# Patient Record
Sex: FEMALE | Race: AMERICAN INDIAN OR ALASKA NATIVE | NOT HISPANIC OR LATINO | Employment: FULL TIME | URBAN - METROPOLITAN AREA
[De-identification: names, ages, dates, MRNs, and addresses within clinical notes are randomized per-mention and may not be internally consistent; named-entity substitution may affect disease eponyms.]

---

## 2018-02-23 ENCOUNTER — HOSPITAL ENCOUNTER (EMERGENCY)
Facility: MEDICAL CENTER | Age: 28
End: 2018-02-23
Attending: EMERGENCY MEDICINE
Payer: MEDICAID

## 2018-02-23 VITALS
SYSTOLIC BLOOD PRESSURE: 130 MMHG | BODY MASS INDEX: 30.22 KG/M2 | RESPIRATION RATE: 15 BRPM | DIASTOLIC BLOOD PRESSURE: 85 MMHG | OXYGEN SATURATION: 100 % | HEART RATE: 108 BPM | HEIGHT: 66 IN | WEIGHT: 188.05 LBS | TEMPERATURE: 98 F

## 2018-02-23 DIAGNOSIS — L98.491 SKIN ULCER, LIMITED TO BREAKDOWN OF SKIN (HCC): ICD-10-CM

## 2018-02-23 PROCEDURE — A9270 NON-COVERED ITEM OR SERVICE: HCPCS | Performed by: EMERGENCY MEDICINE

## 2018-02-23 PROCEDURE — 700102 HCHG RX REV CODE 250 W/ 637 OVERRIDE(OP): Performed by: EMERGENCY MEDICINE

## 2018-02-23 PROCEDURE — 99283 EMERGENCY DEPT VISIT LOW MDM: CPT

## 2018-02-23 RX ORDER — CEPHALEXIN 500 MG/1
500 CAPSULE ORAL 4 TIMES DAILY
Qty: 28 CAP | Refills: 0 | Status: SHIPPED | OUTPATIENT
Start: 2018-02-23 | End: 2018-03-02

## 2018-02-23 RX ORDER — SULFAMETHOXAZOLE AND TRIMETHOPRIM 800; 160 MG/1; MG/1
1 TABLET ORAL 2 TIMES DAILY
Qty: 14 TAB | Refills: 0 | Status: SHIPPED | OUTPATIENT
Start: 2018-02-23 | End: 2018-03-02

## 2018-02-23 RX ORDER — IBUPROFEN 600 MG/1
600 TABLET ORAL ONCE
Status: COMPLETED | OUTPATIENT
Start: 2018-02-23 | End: 2018-02-23

## 2018-02-23 RX ADMIN — IBUPROFEN 600 MG: 600 TABLET, FILM COATED ORAL at 05:05

## 2018-02-23 NOTE — DISCHARGE INSTRUCTIONS
Skin Ulcer  A skin ulcer is an open sore that can be shallow or deep. Skin ulcers sometimes become infected and are difficult to treat. It may be 1 month or longer before real healing progress is made.  CAUSES   · Injury.  · Problems with the veins or arteries.  · Diabetes.  · Insect bites.  · Bedsores.  · Inflammatory conditions.  SYMPTOMS   · Pain, redness, swelling, and tenderness around the ulcer.  · Fever.  · Bleeding from the ulcer.  · Yellow or clear fluid coming from the ulcer.  DIAGNOSIS   There are many types of skin ulcers. Any open sores will be examined. Certain tests will be done to determine the kind of ulcer you have. The right treatment depends on the type of ulcer you have.  TREATMENT   Treatment is a long-term challenge. It may include:  · Wearing an elastic wrap, compression stockings, or gel cast over the ulcer area.  · Taking antibiotic medicines or putting antibiotic creams on the affected area if there is an infection.  HOME CARE INSTRUCTIONS  · Put on your bandages (dressings), wraps, or casts over the ulcer as directed by your caregiver.  · Change all dressings as directed by your caregiver.  · Take all medicines as directed by your caregiver.  · Keep the affected area clean and dry.  · Avoid injuries to the affected area.  · Eat a well-balanced, healthy diet that includes plenty of fruit and vegetables.  · If you smoke, consider quitting or decreasing the amount of cigarettes you smoke.  · Once the ulcer heals, get regular exercise as directed by your caregiver.  · Work with your caregiver to make sure your blood pressure, cholesterol, and diabetes are well-controlled.  · Keep your skin moisturized. Dry skin can crack and lead to skin ulcers.  SEEK IMMEDIATE MEDICAL CARE IF:   · Your pain gets worse.  · You have swelling, redness, or fluids around the ulcer.  · You have chills.  · You have a fever.  MAKE SURE YOU:   · Understand these instructions.  · Will watch your condition.  · Will get  help right away if you are not doing well or get worse.     This information is not intended to replace advice given to you by your health care provider. Make sure you discuss any questions you have with your health care provider.     Document Released: 01/25/2006 Document Revised: 03/11/2013 Document Reviewed: 08/03/2012  Elsevier Interactive Patient Education ©2016 [employer]

## 2018-02-23 NOTE — ED PROVIDER NOTES
"ED Provider Note    Scribed for Clyde Sandoval M.D. by Alexi Gabriel. 2/23/2018, 4:25 AM.    Primary care provider: Pcp Not In Computer  Means of arrival: Walk in  History obtained from: Patient  History limited by: None    CHIEF COMPLAINT  Chief Complaint   Patient presents with   • Skin Lesion       HPI  Rhonda Lopez is a 27 y.o. female who presents to the Emergency Department for evaluation of a skin lesion localized to the right side of her neck onset 2 weeks ago. The patient claims the lesion recently began to increase in size when she spent 4 days in [facility]. She reports associated itchiness around the area by the lesion. The patient denies rash or other wounds. No alleviating or exacerbating factors are identified at this time.     REVIEW OF SYSTEMS  See HPI for further details.   E.    PAST MEDICAL HISTORY   None noted    SURGICAL HISTORY   has a past surgical history that includes other.    SOCIAL HISTORY  Social History   Substance Use Topics   • Smoking status: Former Smoker   • Smokeless tobacco: None noted   • Alcohol use No      History   Drug Use No       FAMILY HISTORY  No pertinent family history noted.    CURRENT MEDICATIONS  Reviewed.  See Encounter Summary.     ALLERGIES  No Known Allergies    PHYSICAL EXAM  VITAL SIGNS: /88   Pulse (!) 118   Temp 36.7 °C (98 °F)   Resp 14   Ht 1.676 m (5' 6\")   Wt 85.3 kg (188 lb 0.8 oz)   SpO2 98%   BMI 30.35 kg/m²   Constitutional: Alert in no apparent distress.  HENT: Normocephalic, Atraumatic, Bilateral external ears normal. Nose normal. Small anterior cervical lymphadenopathy on right that is mildly tender to palpation.   Eyes: Pupils are equal and reactive. Conjunctiva normal, non-icteric.   Heart: Tachycardci rate and regular rythm, no murmurs.    Lungs: Clear to auscultation bilaterally.  Skin: Warm, Dry. 3 cm ovoid dermal lesion to right lateral neck at base with rolled edge and ulcerated centered scab. Slight purulent base in middle. "   Neurologic: Alert, Grossly non-focal.   Psychiatric: Anxious, Judgment normal, Appears appropriate and not intoxicated.     COURSE & MEDICAL DECISION MAKING  Pertinent Labs & Imaging studies reviewed. (See chart for details)    4:25 AM - Patient seen and examined at bedside. She was informed she will be discharged home with antibiotics and a referral for a dermatologist. The patient is agreeable to discharge.     Decision Making:  This is a 27 y.o. year old female who presents with ulcerative skin lesion to right neck. Patient does have rolled edges with a central scabs area with slight exudative base. There is no central eschar. Less likely cutaneous anthrax. Additionally possibilities include basal or squamous cell carcinomas although this would be an atypical presentation given the rapid onset. Most likely secondary bacterial infection from excoriation. This does not appear to be fungal. I will prescribe Keflex and Bactrim for empiric treatment and have her follow up with dermatology as referred.    The patient will return for new or worsening symptoms and is stable at the time of discharge.    The patient is referred to a primary physician for blood pressure management, diabetic screening, and for all other preventative health concerns.    DISPOSITION:  Patient will be discharged home in stable condition.    FOLLOW UP:  [place] [id number] [facility] [address]  [facility]  [phone]          Roberto Zazueta M.D.  [address] [facility] [address]  [facility] [address]  [phone]    Schedule an appointment as soon as possible for a visit        OUTPATIENT MEDICATIONS:  Discharge Medication List as of 2/23/2018  4:56 AM      START taking these medications    Details   cephALEXin (KEFLEX) 500 MG Cap Take 1 Cap by mouth 4 times a day for 7 days., Disp-28 Cap, R-0, Print Rx Paper      sulfamethoxazole-trimethoprim (BACTRIM DS) 800-160 MG tablet Take 1 Tab by mouth 2 times a day for 7 days., Disp-14 Tab, R-0, Print Rx Paper                FINAL IMPRESSION  1. Skin ulcer, limited to breakdown of skin (CMS-[facility])          IAlexi (Scribe), am scribing for, and in the presence of, Clyde Sandoval M.D..    Electronically signed by: Alexi Gabriel (Scriberin), 2/23/2018    Clyde ROCKWELL M.D. personally performed the services described in this documentation, as scribed by Alexi Gabriel in my presence, and it is both accurate and complete.    The note accurately reflects work and decisions made by me.  Clyde Sandoval  2/23/2018  5:49 AM

## 2018-02-23 NOTE — ED NOTES
Nurse introduced self to pt. Updated on plan of care, what was to be expected. Pt expressed understanding. Call light within reach, siderail up x1 for safety.

## 2018-02-23 NOTE — ED TRIAGE NOTES
Pt ambulatory to triage. Pt has lesion on R neck. Pt says is started like a pimple/ingrown hair but has gotten worse. Pt also states she may have eaten some bad food and has had an upset stomach for a few days.    Chief Complaint   Patient presents with   • Skin Lesion     Pt placed in lobby, updated on triage process. Pt educated to notified RN or triage tech if changes in condition.

## 2018-10-26 PROCEDURE — 99285 EMERGENCY DEPT VISIT HI MDM: CPT

## 2018-10-27 ENCOUNTER — HOSPITAL ENCOUNTER (EMERGENCY)
Facility: MEDICAL CENTER | Age: 28
End: 2018-10-27
Attending: EMERGENCY MEDICINE
Payer: MEDICAID

## 2018-10-27 ENCOUNTER — APPOINTMENT (OUTPATIENT)
Dept: RADIOLOGY | Facility: MEDICAL CENTER | Age: 28
End: 2018-10-27
Payer: MEDICAID

## 2018-10-27 ENCOUNTER — APPOINTMENT (OUTPATIENT)
Dept: RADIOLOGY | Facility: MEDICAL CENTER | Age: 28
End: 2018-10-27
Attending: EMERGENCY MEDICINE
Payer: MEDICAID

## 2018-10-27 VITALS
OXYGEN SATURATION: 97 % | HEART RATE: 89 BPM | HEIGHT: 67 IN | BODY MASS INDEX: 36.37 KG/M2 | DIASTOLIC BLOOD PRESSURE: 74 MMHG | TEMPERATURE: 98.1 F | SYSTOLIC BLOOD PRESSURE: 126 MMHG | WEIGHT: 231.7 LBS | RESPIRATION RATE: 19 BRPM

## 2018-10-27 DIAGNOSIS — J06.9 VIRAL UPPER RESPIRATORY TRACT INFECTION: ICD-10-CM

## 2018-10-27 DIAGNOSIS — N30.00 ACUTE CYSTITIS WITHOUT HEMATURIA: ICD-10-CM

## 2018-10-27 LAB
ALBUMIN SERPL BCP-MCNC: 4 G/DL (ref 3.2–4.9)
ALBUMIN/GLOB SERPL: 1.4 G/DL
ALP SERPL-CCNC: 82 U/L (ref 30–99)
ALT SERPL-CCNC: 15 U/L (ref 2–50)
ANION GAP SERPL CALC-SCNC: 10 MMOL/L (ref 0–11.9)
APPEARANCE UR: ABNORMAL
AST SERPL-CCNC: 17 U/L (ref 12–45)
BACTERIA #/AREA URNS HPF: ABNORMAL /HPF
BASOPHILS # BLD AUTO: 0.3 % (ref 0–1.8)
BASOPHILS # BLD: 0.05 K/UL (ref 0–0.12)
BILIRUB SERPL-MCNC: 0.3 MG/DL (ref 0.1–1.5)
BILIRUB UR QL STRIP.AUTO: NEGATIVE
BUN SERPL-MCNC: 6 MG/DL (ref 8–22)
CALCIUM SERPL-MCNC: 9.3 MG/DL (ref 8.5–10.5)
CHLORIDE SERPL-SCNC: 102 MMOL/L (ref 96–112)
CO2 SERPL-SCNC: 20 MMOL/L (ref 20–33)
COLOR UR: YELLOW
CREAT SERPL-MCNC: 0.6 MG/DL (ref 0.5–1.4)
EOSINOPHIL # BLD AUTO: 0.01 K/UL (ref 0–0.51)
EOSINOPHIL NFR BLD: 0.1 % (ref 0–6.9)
EPI CELLS #/AREA URNS HPF: ABNORMAL /HPF
ERYTHROCYTE [DISTWIDTH] IN BLOOD BY AUTOMATED COUNT: 47.3 FL (ref 35.9–50)
FLUAV RNA SPEC QL NAA+PROBE: NEGATIVE
FLUBV RNA SPEC QL NAA+PROBE: NEGATIVE
GLOBULIN SER CALC-MCNC: 2.9 G/DL (ref 1.9–3.5)
GLUCOSE SERPL-MCNC: 131 MG/DL (ref 65–99)
GLUCOSE UR STRIP.AUTO-MCNC: NEGATIVE MG/DL
HCG SERPL QL: POSITIVE
HCT VFR BLD AUTO: 32.6 % (ref 37–47)
HGB BLD-MCNC: 10.1 G/DL (ref 12–16)
HYALINE CASTS #/AREA URNS LPF: ABNORMAL /LPF
IMM GRANULOCYTES # BLD AUTO: 0.07 K/UL (ref 0–0.11)
IMM GRANULOCYTES NFR BLD AUTO: 0.5 % (ref 0–0.9)
KETONES UR STRIP.AUTO-MCNC: NEGATIVE MG/DL
LACTATE BLD-SCNC: 1.3 MMOL/L (ref 0.5–2)
LACTATE BLD-SCNC: 2.2 MMOL/L (ref 0.5–2)
LEUKOCYTE ESTERASE UR QL STRIP.AUTO: ABNORMAL
LYMPHOCYTES # BLD AUTO: 0.95 K/UL (ref 1–4.8)
LYMPHOCYTES NFR BLD: 6.6 % (ref 22–41)
MCH RBC QN AUTO: 22.9 PG (ref 27–33)
MCHC RBC AUTO-ENTMCNC: 31 G/DL (ref 33.6–35)
MCV RBC AUTO: 73.9 FL (ref 81.4–97.8)
MICRO URNS: ABNORMAL
MONOCYTES # BLD AUTO: 0.89 K/UL (ref 0–0.85)
MONOCYTES NFR BLD AUTO: 6.2 % (ref 0–13.4)
NEUTROPHILS # BLD AUTO: 12.37 K/UL (ref 2–7.15)
NEUTROPHILS NFR BLD: 86.3 % (ref 44–72)
NITRITE UR QL STRIP.AUTO: NEGATIVE
NRBC # BLD AUTO: 0 K/UL
NRBC BLD-RTO: 0 /100 WBC
PH UR STRIP.AUTO: 5.5 [PH]
PLATELET # BLD AUTO: 286 K/UL (ref 164–446)
PMV BLD AUTO: 8.8 FL (ref 9–12.9)
POTASSIUM SERPL-SCNC: 3.5 MMOL/L (ref 3.6–5.5)
PROT SERPL-MCNC: 6.9 G/DL (ref 6–8.2)
PROT UR QL STRIP: NEGATIVE MG/DL
RBC # BLD AUTO: 4.41 M/UL (ref 4.2–5.4)
RBC # URNS HPF: ABNORMAL /HPF
RBC UR QL AUTO: NEGATIVE
SODIUM SERPL-SCNC: 132 MMOL/L (ref 135–145)
SP GR UR STRIP.AUTO: 1.01
UROBILINOGEN UR STRIP.AUTO-MCNC: 1 MG/DL
WBC # BLD AUTO: 14.3 K/UL (ref 4.8–10.8)
WBC #/AREA URNS HPF: ABNORMAL /HPF

## 2018-10-27 PROCEDURE — A9270 NON-COVERED ITEM OR SERVICE: HCPCS | Performed by: EMERGENCY MEDICINE

## 2018-10-27 PROCEDURE — 700105 HCHG RX REV CODE 258: Performed by: EMERGENCY MEDICINE

## 2018-10-27 PROCEDURE — 700111 HCHG RX REV CODE 636 W/ 250 OVERRIDE (IP): Performed by: EMERGENCY MEDICINE

## 2018-10-27 PROCEDURE — 700102 HCHG RX REV CODE 250 W/ 637 OVERRIDE(OP): Performed by: EMERGENCY MEDICINE

## 2018-10-27 PROCEDURE — 87086 URINE CULTURE/COLONY COUNT: CPT

## 2018-10-27 PROCEDURE — 85025 COMPLETE CBC W/AUTO DIFF WBC: CPT

## 2018-10-27 PROCEDURE — 83605 ASSAY OF LACTIC ACID: CPT

## 2018-10-27 PROCEDURE — 84703 CHORIONIC GONADOTROPIN ASSAY: CPT

## 2018-10-27 PROCEDURE — 87040 BLOOD CULTURE FOR BACTERIA: CPT

## 2018-10-27 PROCEDURE — 36415 COLL VENOUS BLD VENIPUNCTURE: CPT

## 2018-10-27 PROCEDURE — 80053 COMPREHEN METABOLIC PANEL: CPT

## 2018-10-27 PROCEDURE — 87502 INFLUENZA DNA AMP PROBE: CPT

## 2018-10-27 PROCEDURE — 71045 X-RAY EXAM CHEST 1 VIEW: CPT

## 2018-10-27 PROCEDURE — 81001 URINALYSIS AUTO W/SCOPE: CPT

## 2018-10-27 PROCEDURE — 96365 THER/PROPH/DIAG IV INF INIT: CPT

## 2018-10-27 RX ORDER — ACETAMINOPHEN 325 MG/1
650 TABLET ORAL ONCE
Status: COMPLETED | OUTPATIENT
Start: 2018-10-27 | End: 2018-10-27

## 2018-10-27 RX ORDER — CEPHALEXIN 500 MG/1
500 CAPSULE ORAL 3 TIMES DAILY
Qty: 15 CAP | Refills: 0 | Status: SHIPPED | OUTPATIENT
Start: 2018-10-27 | End: 2018-11-01

## 2018-10-27 RX ORDER — SODIUM CHLORIDE 9 MG/ML
30 INJECTION, SOLUTION INTRAVENOUS ONCE
Status: COMPLETED | OUTPATIENT
Start: 2018-10-27 | End: 2018-10-27

## 2018-10-27 RX ADMIN — SODIUM CHLORIDE 3153 ML: 9 INJECTION, SOLUTION INTRAVENOUS at 01:01

## 2018-10-27 RX ADMIN — ACETAMINOPHEN 650 MG: 325 TABLET, FILM COATED ORAL at 04:43

## 2018-10-27 RX ADMIN — CEFTRIAXONE SODIUM 2 G: 2 INJECTION, POWDER, FOR SOLUTION INTRAMUSCULAR; INTRAVENOUS at 01:56

## 2018-10-27 NOTE — ED TRIAGE NOTES
Pt ambulatory to triage c/o chest/ nasal congestion/ sore throat/ productive cough x 2 weeks. Fever today. Oral temp 100.6 in triage. Pt reports hx of asthma used inhaler PTA. Dry cough noted, Mask applied in triage. Pt also reports she is pregnant, LMP end of August. Sepsis score of 3. Charge RN informed, working on room.

## 2018-10-27 NOTE — ED NOTES
Second set of blood cultures drawn and sent to lab. UA sample obtained. Pt on monitor, fluids started

## 2018-10-27 NOTE — ED PROVIDER NOTES
"ED Provider Note    CHIEF COMPLAINT  Chief Complaint   Patient presents with   • Cold Symptoms       HPI  Rhonda Lopez is a 27 y.o. female who presents with chest congestion, nasal congestion, sore throat, cough.  Has been worsening over the past few days.  Cough is dry in nature.  No known sick contacts.  Recent travel.  Presenting with fever.  Slight chest discomfort with coughing.    The patient did have some nausea recently and did a home pregnancy test.  Last menstrual period was in August.  States that she believes she is pregnant.  Denies vaginal bleeding or discharge.  Denies abdominal pain.  No dysuria or hematuria.    REVIEW OF SYSTEMS  See HPI for further details. All other systems are negative.     PAST MEDICAL HISTORY   Denies significant past medical history.    SOCIAL HISTORY  Social History     Social History Main Topics   • Smoking status: Current Every Day Smoker     Packs/day: 0.50     Types: Cigarettes   • Smokeless tobacco: Never Used   • Alcohol use No   • Drug use: No   • Sexual activity: Not on file       SURGICAL HISTORY   has a past surgical history that includes other.    CURRENT MEDICATIONS  Home Medications    **Home medications have not yet been reviewed for this encounter**         ALLERGIES  No Known Allergies    PHYSICAL EXAM  VITAL SIGNS: /74   Pulse (!) 137   Temp (!) 38.1 °C (100.6 °F)   Resp 20   Ht 1.689 m (5' 6.5\")   Wt 105.1 kg (231 lb 11.3 oz)   LMP 08/20/2018   SpO2 97%   BMI 36.84 kg/m²    Pulse ox interpretation: I interpret this pulse ox as normal.  Constitutional: Alert in no apparent distress.  HENT: No signs of trauma, Bilateral external ears normal, Nose normal.  Dry mucous membranes.  Eyes: Pupils are equal and reactive, Conjunctiva normal, Non-icteric.   Neck: Normal range of motion, No tenderness, Supple, No stridor.   Lymphatic: No lymphadenopathy noted.   Cardiovascular: Tachycardic rate and regular rhythm.   Thorax & Lungs: Normal breath " "sounds, No respiratory distress, No wheezing, No chest tenderness.   Abdomen: Bowel sounds normal, Soft, No tenderness, No masses, No pulsatile masses. No peritoneal signs.  Skin: Warm, Dry, No erythema, No rash.   Back: No bony tenderness, No CVA tenderness.   Extremities: Intact distal pulses, No edema, No tenderness, No cyanosis  Neurologic: Alert, No focal deficits noted.       DIAGNOSTIC STUDIES / PROCEDURES      LABS  Labs Reviewed   LACTIC ACID - Abnormal; Notable for the following:        Result Value    Lactic Acid 2.2 (*)     All other components within normal limits   CBC WITH DIFFERENTIAL - Abnormal; Notable for the following:     WBC 14.3 (*)     Hemoglobin 10.1 (*)     Hematocrit 32.6 (*)     MCV 73.9 (*)     MCH 22.9 (*)     MCHC 31.0 (*)     MPV 8.8 (*)     Neutrophils-Polys 86.30 (*)     Lymphocytes 6.60 (*)     Neutrophils (Absolute) 12.37 (*)     Lymphs (Absolute) 0.95 (*)     Monos (Absolute) 0.89 (*)     All other components within normal limits   COMP METABOLIC PANEL - Abnormal; Notable for the following:     Sodium 132 (*)     Potassium 3.5 (*)     Glucose 131 (*)     Bun 6 (*)     All other components within normal limits   URINALYSIS - Abnormal; Notable for the following:     Character Cloudy (*)     Leukocyte Esterase Moderate (*)     All other components within normal limits    Narrative:     Indication for culture:->Emergency Room Patient   HCG QUAL SERUM - Abnormal; Notable for the following:     Beta-Hcg Qualitative Serum Positive (*)     All other components within normal limits   URINE MICROSCOPIC (W/UA) - Abnormal; Notable for the following:     WBC 20-50 (*)     Bacteria Moderate (*)     All other components within normal limits    Narrative:     Indication for culture:->Emergency Room Patient   LACTIC ACID   URINE CULTURE(NEW)    Narrative:     Indication for culture:->Emergency Room Patient   BLOOD CULTURE    Narrative:     Per Hospital Policy: Only change Specimen Src: to \"Line\" " "if  specified by physician order.   BLOOD CULTURE    Narrative:     Per Hospital Policy: Only change Specimen Src: to \"Line\" if  specified by physician order.   INFLUENZA A/B BY PCR    Narrative:     Indication for culture:->Emergency Room Patient   ESTIMATED GFR       RADIOLOGY  DX-CHEST-PORTABLE (1 VIEW)   Final Result      No acute cardiopulmonary abnormality.          COURSE & MEDICAL DECISION MAKING  Pertinent Labs & Imaging studies reviewed. (See chart for details)  27 y.o. Female presenting with cough, congestion, fevers.  Tachycardic upon arrival.  Clear breath sounds bilaterally.  No respiratory distress.  Symptoms are most consistent with an upper respiratory tract infection of a viral etiology.  Cannot fully rule out pneumonia however.  X-ray was performed that was unremarkable.  Laboratory studies showing the patient is influenza negative.  Slight leukocytosis and minimally elevated lactic acid.  Was given IV fluid resuscitation for concerns of sepsis and significant tachycardia.  30 cc/kg bolus was ordered for the patient.    Incidentally, the patient is pregnant.  No abdominal pain.  No vaginal bleeding or discharge.  No dysuria or hematuria.  Does not have symptoms of urinary tract infection.  Urinalysis is concerning for urinary tract infection however.  She was given a dose of ceftriaxone here while she was being monitored for response to therapies administered in the emergency department.    Upon reevaluation, the patient has much improved vital signs.  Continues to deny abdominal pain, nausea, vomiting.  She reconfirms that her primary complaints for cough and congestion.  No obvious signs of bacterial respiratory illness.  Clear voice.  No stridor.  No wheezing.  No rales.  No evidence of respiratory distress.    Vitals are markedly improved upon reevaluation.  Patient is feeling improved overall as well.  Strongly suspect an upper respiratory tract infection of a viral etiology.  Has incidental " "urinary tract infection findings on urinalysis.  Especially given the patient's pregnant status, will treat with antibiotics for suspected urinary tract infection.  To follow-up with primary care physician and pregnancy center for further management.  To return immediately for any worsening of the patient's symptoms or development of any other concerning signs or symptoms.  Lactic acid improved upon reevaluation following IV fluid resuscitation.    The patient will not drink alcohol nor drive with prescribed medications.   The patient will return for worsening symptoms or failure of improvement and is stable at the time of discharge. The patient verbalizes understanding in their own words.    /74   Pulse 89   Temp 36.7 °C (98.1 °F)   Resp 19   Ht 1.689 m (5' 6.5\")   Wt 105.1 kg (231 lb 11.3 oz)   LMP 08/20/2018   SpO2 97%   BMI 36.84 kg/m²      The patient was referred to primary care where they will receive further BP management.      HYDRATION: Based on the patient's presentation of Sepsis and Tachycardia the patient was given IV fluids. IV Hydration was used becasue oral hydration was not as rapid as required. Upon recheck following hydration, the patient was Improved.      [facility], Emergency Dept  [facility] [facility] [address] [phone]    As needed, If symptoms worsen    Pcp Not In Computer    Schedule an appointment as soon as possible for a visit       FINAL IMPRESSION  1. Viral upper respiratory tract infection    2. Acute cystitis without hematuria            Electronically signed by: Orestes Lemus, 10/27/2018 12:42 AM    "

## 2018-10-29 LAB
BACTERIA UR CULT: ABNORMAL
BACTERIA UR CULT: ABNORMAL
SIGNIFICANT IND 70042: ABNORMAL
SITE SITE: ABNORMAL
SOURCE SOURCE: ABNORMAL

## 2018-10-31 ENCOUNTER — NON-PROVIDER VISIT (OUTPATIENT)
Dept: OBGYN | Facility: CLINIC | Age: 28
End: 2018-10-31
Payer: MEDICAID

## 2018-10-31 DIAGNOSIS — Z32.01 PREGNANCY EXAMINATION OR TEST, POSITIVE RESULT: ICD-10-CM

## 2018-10-31 LAB
INT CON NEG: NEGATIVE
INT CON POS: POSITIVE
POC URINE PREGNANCY TEST: POSITIVE

## 2018-10-31 PROCEDURE — 81025 URINE PREGNANCY TEST: CPT | Performed by: OBSTETRICS & GYNECOLOGY

## 2018-10-31 NOTE — ED NOTES
"ED Positive Culture Follow-up/Notification Note:    Date: 10/31/18     Patient seen in the ED on 10/26/2018 for upper respiratory symptoms with fever.   1. Viral upper respiratory tract infection    2. Acute cystitis without hematuria       Patient received ceftriaxone 2gm IV once before discharge.  Discharge Medication List as of 10/27/2018  5:33 AM      START taking these medications    Details   cephALEXin (KEFLEX) 500 MG Cap Take 1 Cap by mouth 3 times a day for 5 days., Disp-15 Cap, R-0, Print Rx Paper             Allergies: Patient has no known allergies.     Vitals:    10/27/18 0430 10/27/18 0500 10/27/18 0513 10/27/18 0530   BP:       Pulse: (!) 101 96  89   Resp: 18 19 19   Temp:   36.7 °C (98.1 °F)    TempSrc:       SpO2: 98% 98%  97%   Weight:       Height:           Final cultures:   Results     Procedure Component Value Units Date/Time    URINE CULTURE(NEW) [[id number]]  (Abnormal) Collected:  10/27/18 0100    Order Status:  Completed Specimen:  Urine Updated:  10/29/18 0933     Significant Indicator POS (POS)     Source UR     Site --     Urine Culture Mixed skin amilcar 10-50,000 cfu/mL (A)      Probable Gardnerella vaginalis  >100,000 cfu/mL   (A)    Narrative:       Indication for culture:->Emergency Room Patient    BLOOD CULTURE [[id number]] Collected:  10/27/18 0105    Order Status:  Completed Specimen:  Blood from Peripheral Updated:  10/28/18 0926     Significant Indicator NEG     Source BLD     Site PERIPHERAL     Blood Culture No Growth    Note: Blood cultures are incubated for 5 days and  are monitored continuously.Positive blood cultures  are called to the RN and reported as soon as  they are identified.      Narrative:       Per Hospital Policy: Only change Specimen Src: to \"Line\" if  specified by physician order.    BLOOD CULTURE [[id number]] Collected:  10/27/18 0045    Order Status:  Completed Specimen:  Blood from Peripheral Updated:  10/28/18 0926     Significant Indicator NEG     Source " "BLD     Site PERIPHERAL     Blood Culture No Growth    Note: Blood cultures are incubated for 5 days and  are monitored continuously.Positive blood cultures  are called to the RN and reported as soon as  they are identified.      Narrative:       Per Hospital Policy: Only change Specimen Src: to \"Line\" if  specified by physician order.    INFLUENZA A/B BY PCR [[id number]] Collected:  10/27/18 0100    Order Status:  Completed Specimen:  Urine Updated:  10/27/18 0202     Influenza virus A RNA Negative     Influenza virus B, PCR Negative    Narrative:       Indication for culture:->Emergency Room Patient    URINALYSIS [[id number]]  (Abnormal) Collected:  10/27/18 0100    Order Status:  Completed Specimen:  Urine Updated:  10/27/18 0118     Color Yellow     Character Cloudy (A)     Specific Gravity 1.007     Ph 5.5     Glucose Negative mg/dL      Ketones Negative mg/dL      Protein Negative mg/dL      Bilirubin Negative     Urobilinogen, Urine 1.0     Nitrite Negative     Leukocyte Esterase Moderate (A)     Occult Blood Negative     Micro Urine Req Microscopic    Narrative:       Indication for culture:->Emergency Room Patient    INFLUENZA RAPID [[id number]]     Order Status:  Canceled Specimen:  Throat from Respiratory           Plan:   Patient did not present with urinary symptoms, however she stated she believed she is pregnant. No changes required based upon culture result. This is likely not a true pathogen.      Shanna Marie, PharmD    "

## 2018-11-01 LAB
BACTERIA BLD CULT: NORMAL
BACTERIA BLD CULT: NORMAL
SIGNIFICANT IND 70042: NORMAL
SIGNIFICANT IND 70042: NORMAL
SITE SITE: NORMAL
SITE SITE: NORMAL
SOURCE SOURCE: NORMAL
SOURCE SOURCE: NORMAL

## 2018-11-09 ENCOUNTER — INITIAL PRENATAL (OUTPATIENT)
Dept: OBGYN | Facility: CLINIC | Age: 28
End: 2018-11-09
Payer: MEDICAID

## 2018-11-09 ENCOUNTER — HOSPITAL ENCOUNTER (OUTPATIENT)
Facility: MEDICAL CENTER | Age: 28
End: 2018-11-09
Attending: NURSE PRACTITIONER
Payer: MEDICAID

## 2018-11-09 ENCOUNTER — HOSPITAL ENCOUNTER (OUTPATIENT)
Dept: LAB | Facility: MEDICAL CENTER | Age: 28
End: 2018-11-09
Attending: NURSE PRACTITIONER
Payer: MEDICAID

## 2018-11-09 VITALS
DIASTOLIC BLOOD PRESSURE: 64 MMHG | WEIGHT: 225 LBS | BODY MASS INDEX: 36.16 KG/M2 | HEIGHT: 66 IN | SYSTOLIC BLOOD PRESSURE: 110 MMHG

## 2018-11-09 DIAGNOSIS — O09.91 SUPERVISION OF HIGH RISK PREGNANCY IN FIRST TRIMESTER: Primary | ICD-10-CM

## 2018-11-09 DIAGNOSIS — O09.91 SUPERVISION OF HIGH RISK PREGNANCY IN FIRST TRIMESTER: ICD-10-CM

## 2018-11-09 DIAGNOSIS — F17.210 CIGARETTE NICOTINE DEPENDENCE WITHOUT COMPLICATION: ICD-10-CM

## 2018-11-09 DIAGNOSIS — J45.20 MILD INTERMITTENT ASTHMA WITHOUT COMPLICATION: ICD-10-CM

## 2018-11-09 DIAGNOSIS — Z98.891 HISTORY OF CESAREAN DELIVERY: ICD-10-CM

## 2018-11-09 DIAGNOSIS — F19.91 HISTORY OF DRUG USE: ICD-10-CM

## 2018-11-09 LAB
ABO GROUP BLD: NORMAL
AMORPH CRY #/AREA URNS HPF: PRESENT /HPF
APPEARANCE UR: ABNORMAL
APPEARANCE UR: NORMAL
BACTERIA #/AREA URNS HPF: NEGATIVE /HPF
BASOPHILS # BLD AUTO: 0.7 % (ref 0–1.8)
BASOPHILS # BLD: 0.07 K/UL (ref 0–0.12)
BILIRUB UR QL STRIP.AUTO: NEGATIVE
BILIRUB UR STRIP-MCNC: NORMAL MG/DL
BLD GP AB SCN SERPL QL: NORMAL
COLOR UR AUTO: NORMAL
COLOR UR: YELLOW
EOSINOPHIL # BLD AUTO: 0.13 K/UL (ref 0–0.51)
EOSINOPHIL NFR BLD: 1.3 % (ref 0–6.9)
EPI CELLS #/AREA URNS HPF: ABNORMAL /HPF
ERYTHROCYTE [DISTWIDTH] IN BLOOD BY AUTOMATED COUNT: 47.8 FL (ref 35.9–50)
GLUCOSE UR STRIP.AUTO-MCNC: NEGATIVE MG/DL
GLUCOSE UR STRIP.AUTO-MCNC: NEGATIVE MG/DL
HBV SURFACE AG SER QL: NEGATIVE
HCT VFR BLD AUTO: 37.2 % (ref 37–47)
HCV AB SER QL: NEGATIVE
HGB BLD-MCNC: 11.7 G/DL (ref 12–16)
HIV 1+2 AB+HIV1 P24 AG SERPL QL IA: NON REACTIVE
HYALINE CASTS #/AREA URNS LPF: ABNORMAL /LPF
IMM GRANULOCYTES # BLD AUTO: 0.02 K/UL (ref 0–0.11)
IMM GRANULOCYTES NFR BLD AUTO: 0.2 % (ref 0–0.9)
KETONES UR STRIP.AUTO-MCNC: NEGATIVE MG/DL
KETONES UR STRIP.AUTO-MCNC: NEGATIVE MG/DL
LEUKOCYTE ESTERASE UR QL STRIP.AUTO: ABNORMAL
LEUKOCYTE ESTERASE UR QL STRIP.AUTO: NORMAL
LYMPHOCYTES # BLD AUTO: 1.92 K/UL (ref 1–4.8)
LYMPHOCYTES NFR BLD: 19.9 % (ref 22–41)
MCH RBC QN AUTO: 23.9 PG (ref 27–33)
MCHC RBC AUTO-ENTMCNC: 31.5 G/DL (ref 33.6–35)
MCV RBC AUTO: 75.9 FL (ref 81.4–97.8)
MICRO URNS: ABNORMAL
MONOCYTES # BLD AUTO: 0.62 K/UL (ref 0–0.85)
MONOCYTES NFR BLD AUTO: 6.4 % (ref 0–13.4)
MUCOUS THREADS #/AREA URNS HPF: ABNORMAL /HPF
NEUTROPHILS # BLD AUTO: 6.91 K/UL (ref 2–7.15)
NEUTROPHILS NFR BLD: 71.5 % (ref 44–72)
NITRITE UR QL STRIP.AUTO: NEGATIVE
NITRITE UR QL STRIP.AUTO: NEGATIVE
NRBC # BLD AUTO: 0 K/UL
NRBC BLD-RTO: 0 /100 WBC
PH UR STRIP.AUTO: 5.5 [PH]
PH UR STRIP.AUTO: 5.5 [PH] (ref 5–8)
PLATELET # BLD AUTO: 396 K/UL (ref 164–446)
PMV BLD AUTO: 9.1 FL (ref 9–12.9)
PROT UR QL STRIP: NEGATIVE MG/DL
PROT UR QL STRIP: NORMAL MG/DL
RBC # BLD AUTO: 4.9 M/UL (ref 4.2–5.4)
RBC # URNS HPF: ABNORMAL /HPF
RBC UR QL AUTO: NEGATIVE
RBC UR QL AUTO: NEGATIVE
RH BLD: NORMAL
RPR SER QL: REACTIVE
RPR SER-TITR: NORMAL {TITER}
RUBV AB SER QL: 4.4 IU/ML
SP GR UR STRIP.AUTO: 1.03
SP GR UR STRIP.AUTO: 1.03
TREPONEMA PALLIDUM IGG+IGM AB [PRESENCE] IN SERUM OR PLASMA BY IMMUNOASSAY: REACTIVE
UROBILINOGEN UR STRIP-MCNC: NORMAL MG/DL
UROBILINOGEN UR STRIP.AUTO-MCNC: 0.2 MG/DL
WBC # BLD AUTO: 9.7 K/UL (ref 4.8–10.8)
WBC #/AREA URNS HPF: ABNORMAL /HPF

## 2018-11-09 PROCEDURE — 86780 TREPONEMA PALLIDUM: CPT

## 2018-11-09 PROCEDURE — 86850 RBC ANTIBODY SCREEN: CPT

## 2018-11-09 PROCEDURE — 36415 COLL VENOUS BLD VENIPUNCTURE: CPT

## 2018-11-09 PROCEDURE — 86762 RUBELLA ANTIBODY: CPT

## 2018-11-09 PROCEDURE — 88175 CYTOPATH C/V AUTO FLUID REDO: CPT

## 2018-11-09 PROCEDURE — 81001 URINALYSIS AUTO W/SCOPE: CPT

## 2018-11-09 PROCEDURE — 59401 PR NEW OB VISIT: CPT | Performed by: NURSE PRACTITIONER

## 2018-11-09 PROCEDURE — 86901 BLOOD TYPING SEROLOGIC RH(D): CPT

## 2018-11-09 PROCEDURE — 87389 HIV-1 AG W/HIV-1&-2 AB AG IA: CPT

## 2018-11-09 PROCEDURE — 86803 HEPATITIS C AB TEST: CPT

## 2018-11-09 PROCEDURE — 87591 N.GONORRHOEAE DNA AMP PROB: CPT

## 2018-11-09 PROCEDURE — 87491 CHLMYD TRACH DNA AMP PROBE: CPT

## 2018-11-09 PROCEDURE — 81002 URINALYSIS NONAUTO W/O SCOPE: CPT | Performed by: NURSE PRACTITIONER

## 2018-11-09 PROCEDURE — 86592 SYPHILIS TEST NON-TREP QUAL: CPT

## 2018-11-09 PROCEDURE — 87340 HEPATITIS B SURFACE AG IA: CPT

## 2018-11-09 PROCEDURE — 86593 SYPHILIS TEST NON-TREP QUANT: CPT

## 2018-11-09 PROCEDURE — 85025 COMPLETE CBC W/AUTO DIFF WBC: CPT

## 2018-11-09 PROCEDURE — 86900 BLOOD TYPING SEROLOGIC ABO: CPT

## 2018-11-09 ASSESSMENT — ENCOUNTER SYMPTOMS
EYES NEGATIVE: 1
RESPIRATORY NEGATIVE: 1
MUSCULOSKELETAL NEGATIVE: 1
CONSTITUTIONAL NEGATIVE: 1
GASTROINTESTINAL NEGATIVE: 1
NEUROLOGICAL NEGATIVE: 1
CARDIOVASCULAR NEGATIVE: 1

## 2018-11-09 ASSESSMENT — LIFESTYLE VARIABLES: SUBSTANCE_ABUSE: 1

## 2018-11-09 NOTE — LETTER
Cystic Fibrosis Carrier Testing  Rhonda Lopez    The following information is about a blood test that can be done to determine if you and/or your partner carry the gene for cystic fibrosis.    WHAT IS CYSTIC FIBROSIS?  · Cystic fibrosis (CF) is an inherited disease that affects more than 25,000 [place] children and young adults.  · Symptoms of CF vary but include lung congestion, pneumonia, diarrhea and poor growth.  Most people with CF have severe medical problems and some die at a young age.  Others have so few symptoms they are unaware they have CF.  · CF does not affect intelligence.  · Although there is no cure for CF at this time, scientists are making progress in improving treatment and in searching for a cure.  In the past many people with CF  at a very young age.  Today, many are living into their 20’s and 30’s.    IS THERE A CHANCE MY BABY COULD HAVE CYSTIC FIBROSIS?  · You can have a child with CF even if there is no history in your family (see chart below).  · CF testing can help determine if you are a carrier and at risk to have a child with CF.  Note: if both parents are carriers, there is a 1 in 4 (25%) chance with each pregnancy that they will have a child with CF.  · Carriers have one normal CF gene and one altered CF gene.  · People with CF have two altered CF genes.  · Most people have two normal copies of the CF gene.    Approximate risk that a couple with no family history of cystic fibrosis will have a child with cystic fibrosis:    Ethnic background / Risk     couple:  1 in 2,500   couple:  1 in 15,000            couple:  1 in 8,000     American couple:  1 in 32,000     WHAT TESTING IS AVAILABLE?  · There is a blood test that can be done to find out if you or your partner is a carrier.  · It is important to understand that CF carrier testing does not detect all CF carriers.  · If the test shows that you are both CF carriers, you unborn baby can be  tested to find out if the baby has CF.    HOW MUCH DOES IT COST TO HAVE CYSTIC FIBROSIS CARRIER TESTING?  · Cost and insurance coverage for CF carrier testing vary depending upon the laboratory used and your insurance policy.  · The average cost for CF carrier testing is $300 per person.  · Your genetic counselor can provide you with more information about cystic fibrosis carrier testing.    _____  Yes, I am interested in discussing carrier testing with a genetic counselor.    _____  No, I am not interested in CF carrier testing or in receiving more information about CF carrier testing.      Client signature: ________________________________________  11/9/2018

## 2018-11-09 NOTE — PROGRESS NOTES
S:  Rhonda Lopez is a 27 y.o.  who presents for her new OB exam.  She is 10w4d with and PAUL of Estimated Date of Delivery: 6/3/19 based off of LMP . She has no complaints.  She is currently not working, no heavy lifting or chemical exposure. 1 ER visits or previous care in this pregnancy, in October, where she was seen for cold/UTI and was tx with abx for same.    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  She states she quit with her first pregnancy 10 years ago and intends to quit again this time.  She denies currently feeling the need to use.  She declines at this time to seek any treatment for her drug abuse.      She has a hx of asthma.  She only uses her albuterol inhaler when she is sick.    Desires AFP.  Declines CF.  Denies VB, LOF, or cramping.  Denies dysuria, vaginal DC. Reports no fetal movement.  She is experiencing nausea/vomiting, vomiting every day.      Pt is single and lives with her friend.  Pregnancy is unplanned but welcomed.  She is with the FOB.      Discussed diet and exercise during pregnancy. Encouraged good nutrition, and daily exercise including walking or swimming. Discussed expected weight gain during pregnancy. Discussed adequate hydration during pregnancy.  Review of Systems   Constitutional: Negative.    HENT: Negative.    Eyes: Negative.    Respiratory: Negative.    Cardiovascular: Negative.    Gastrointestinal: Negative.    Genitourinary: Negative.    Musculoskeletal: Negative.    Skin: Negative.    Neurological: Negative.    Endo/Heme/Allergies: Negative.    Psychiatric/Behavioral: Positive for substance abuse.   All other systems reviewed and are negative.      Past Medical History:   Diagnosis Date   • Asthma     uses albuterol PRN   • Hypoglycemia     Dx 10 yrs ago   • Substance abuse (HCC)     last used methamphetamines 10/30/2018     History reviewed. No pertinent family history.  Social History     Social History   • Marital status: Single     Spouse  "name: N/A   • Number of children: N/A   • Years of education: N/A     Occupational History   • Not on file.     Social History Main Topics   • Smoking status: Current Every Day Smoker     Packs/day: 0.25     Years: 10.00     Types: Cigarettes   • Smokeless tobacco: Never Used      Comment: Trying to quit smoking   • Alcohol use No   • Drug use: Yes     Types: Methamphetamines      Comment: last used methamphetamines 10/30/2018   • Sexual activity: Not Currently     Partners: Male      Comment: Unplanned pregnancy. was not on birth control     Other Topics Concern   • Not on file     Social History Narrative   • No narrative on file     OB History    Para Term  AB Living   3 1 1   1 1   SAB TAB Ectopic Molar Multiple Live Births   1         1      # Outcome Date GA Lbr Aubrey/2nd Weight Sex Delivery Anes PTL Lv   3 Current            2 Term 08 40w0d  2.92 kg (6 lb 7 oz) F CS-LTranv Spinal N DELANEY      Birth Comments: C/Section due to unable to progress   1 SAB 10/27/07 4w0d    SAB         Birth Comments: PAssed on its own          History of Varicella Virus:had as child  History of HSV I or II in self or partner: denies  History of Thyroid problems: denies    O:  Height 1.68 m (5' 6.14\"), weight 102.1 kg (225 lb), last menstrual period 2018.   See Prenatal Physical.    Wet mount: deferred  Physical Exam   Constitutional: She is oriented to person, place, and time and well-developed, well-nourished, and in no distress.   HENT:   Head: Normocephalic and atraumatic.   Right Ear: External ear normal.   Left Ear: External ear normal.   Nose: Nose normal.   Eyes: Pupils are equal, round, and reactive to light. Conjunctivae and EOM are normal.   Neck: Normal range of motion. Neck supple. No thyromegaly present.   Cardiovascular: Normal rate, regular rhythm, normal heart sounds and intact distal pulses.    Pulmonary/Chest: Effort normal and breath sounds normal.   Abdominal: Soft. Bowel sounds are " normal. She exhibits no distension and no mass. There is no tenderness. There is no rebound and no guarding.   Genitourinary: Vagina normal, uterus normal, cervix normal, right adnexa normal and left adnexa normal.   Musculoskeletal: Normal range of motion. She exhibits no edema.   Neurological: She is alert and oriented to person, place, and time. Gait normal. GCS score is 15.   Skin: Skin is warm and dry.   Psychiatric: Mood, memory, affect and judgment normal.   Fidgety during visit       A:   1.  IUP @ 10w4d per LMP        2.  S=D        3.  See problem list below        4. Desires repeat c/s, declines tubal       Patient Active Problem List    Diagnosis Date Noted   • Mild intermittent asthma without complication 2018   • History of drug use 2018   • History of  delivery 2018   • Supervision of high risk pregnancy in first trimester 2018         P:  1.  GC/CT & pap done        2.  Prenatal labs ordered - lab slip given        3.  Discussed PNV, diet, avoidances and adequate water intake        4.  NOB packet given        5.  Return to office in 4 wks        6.  Complete OB US in 10 wks        7. Repeat c/section after 39 weeks       8. Resource list given as well as [facility] information       9.  Discussed procedure of UDS upon entry to hospital      10.  Refuses flu vacc today       11.  Warning s/s of spontaneous  reviewed  No orders of the defined types were placed in this encounter.

## 2018-11-13 PROBLEM — Z28.39 RUBELLA NON-IMMUNE STATUS, ANTEPARTUM: Status: ACTIVE | Noted: 2018-11-13

## 2018-11-13 PROBLEM — O09.899 RUBELLA NON-IMMUNE STATUS, ANTEPARTUM: Status: ACTIVE | Noted: 2018-11-13

## 2018-11-13 LAB
C TRACH DNA GENITAL QL NAA+PROBE: NEGATIVE
CYTOLOGY REG CYTOL: NORMAL
N GONORRHOEA DNA GENITAL QL NAA+PROBE: NEGATIVE
SPECIMEN SOURCE: NORMAL

## 2018-11-15 ENCOUNTER — TELEPHONE (OUTPATIENT)
Dept: OBGYN | Facility: CLINIC | Age: 28
End: 2018-11-15

## 2018-11-15 NOTE — TELEPHONE ENCOUNTER
Called patient, no answer. Left voicemail asking patient to call us back to go over her lab results.   Patient's Syphilis was Reactive.     ----- Message from Barbara Llanes C.N.M. sent at 11/14/2018  2:33 PM PST -----  Please call patient with results. See if she has ever been positive and treated. If not, she needs treatment. Will order titer now. Please have her get done ASAP

## 2018-12-02 ENCOUNTER — APPOINTMENT (OUTPATIENT)
Dept: RADIOLOGY | Facility: MEDICAL CENTER | Age: 28
End: 2018-12-02
Attending: EMERGENCY MEDICINE
Payer: MEDICAID

## 2018-12-02 ENCOUNTER — HOSPITAL ENCOUNTER (EMERGENCY)
Facility: MEDICAL CENTER | Age: 28
End: 2018-12-02
Attending: EMERGENCY MEDICINE
Payer: MEDICAID

## 2018-12-02 VITALS
TEMPERATURE: 97.3 F | WEIGHT: 222.66 LBS | RESPIRATION RATE: 16 BRPM | OXYGEN SATURATION: 98 % | HEIGHT: 66 IN | BODY MASS INDEX: 35.79 KG/M2 | HEART RATE: 86 BPM | SYSTOLIC BLOOD PRESSURE: 115 MMHG | DIASTOLIC BLOOD PRESSURE: 81 MMHG

## 2018-12-02 DIAGNOSIS — R10.30 LOWER ABDOMINAL PAIN: ICD-10-CM

## 2018-12-02 DIAGNOSIS — F07.81 POST CONCUSSION SYNDROME: ICD-10-CM
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
## 2018-12-02 LAB
APPEARANCE UR: ABNORMAL
BACTERIA #/AREA URNS HPF: ABNORMAL /HPF
BILIRUB UR QL STRIP.AUTO: ABNORMAL
CAOX CRY #/AREA URNS HPF: ABNORMAL /HPF
COLOR UR: ABNORMAL
EPI CELLS #/AREA URNS HPF: ABNORMAL /HPF
GLUCOSE UR STRIP.AUTO-MCNC: NEGATIVE MG/DL
HYALINE CASTS #/AREA URNS LPF: ABNORMAL /LPF
KETONES UR STRIP.AUTO-MCNC: ABNORMAL MG/DL
LEUKOCYTE ESTERASE UR QL STRIP.AUTO: ABNORMAL
MICRO URNS: ABNORMAL
NITRITE UR QL STRIP.AUTO: NEGATIVE
PH UR STRIP.AUTO: 5 [PH]
PROT UR QL STRIP: 30 MG/DL
RBC # URNS HPF: ABNORMAL /HPF
RBC UR QL AUTO: NEGATIVE
SP GR UR STRIP.AUTO: 1.03
UROBILINOGEN UR STRIP.AUTO-MCNC: 1 MG/DL
WBC #/AREA URNS HPF: ABNORMAL /HPF

## 2018-12-02 PROCEDURE — 87086 URINE CULTURE/COLONY COUNT: CPT

## 2018-12-02 PROCEDURE — 76815 OB US LIMITED FETUS(S): CPT

## 2018-12-02 PROCEDURE — 76705 ECHO EXAM OF ABDOMEN: CPT

## 2018-12-02 PROCEDURE — 99284 EMERGENCY DEPT VISIT MOD MDM: CPT

## 2018-12-02 PROCEDURE — 70450 CT HEAD/BRAIN W/O DYE: CPT

## 2018-12-02 PROCEDURE — 81001 URINALYSIS AUTO W/SCOPE: CPT

## 2018-12-02 PROCEDURE — 87077 CULTURE AEROBIC IDENTIFY: CPT

## 2018-12-02 ASSESSMENT — PAIN SCALES - GENERAL
PAINLEVEL_OUTOF10: 10
PAINLEVEL_OUTOF10: 10
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
## 2018-12-02 NOTE — ED NOTES
Patient verbalized understanding of discharge instructions, and follow up plan. Paper signed. Patient ambulated without difficulty of ED.
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
## 2018-12-03 ENCOUNTER — PATIENT OUTREACH (OUTPATIENT)
Dept: HEALTH INFORMATION MANAGEMENT | Facility: OTHER | Age: 28
End: 2018-12-03

## 2018-12-03 NOTE — LETTER
Rhonda Lopez  [address]  [place] [address]    December 3, 2018      Dear Rhonda Lopez,    [facility] wants to ensure your discharge home is safe and you or your loved ones have had all of your questions answered regarding your care after you leave the hospital.    Our discharge team was unsuccessful in our attempts to contact you telephonically and we wanted to be sure that you had a list of resources and contact information should you have any questions regarding your hospital stay, follow-up instructions, or active medical symptoms.    Questions or Concerns Regarding… Contact   Medical Questions Related to Your Discharge  (7 days a week, 8am-5pm) Contact a Nurse Care Coordinator   [phone]   Medical Questions Not Related to Your Discharge  (24 hours a day / 7 days a week)  Contact the Nurse Health Line   [phone]    Medications or Discharge Instructions Refer to your discharge packet   or contact your -[phone]   Follow-up Appointment(s) Schedule your appointment via [facility]   or contact Scheduling [phone]   Billing Review your statement via [facility]  or contact Billing [phone]   Medical Records Review your records via [facility]   or contact Medical Records [phone]     You can also easily access your medical information, test results and upcoming appointments via the [facility] [employer] free online health management tool. You can learn more and sign up at [employer]. For assistance setting up your [facility] account, please call [phone].    Once again, we want to ensure your discharge home is safe and that you have a clear understanding of any next steps in your care. If you have any questions or concerns, please do not hesitate to contact us, we are here for you. Thank you for choosing [facility] for your healthcare needs.    Sincerely,      Your [facility] Healthcare Team

## 2018-12-03 NOTE — PROGRESS NOTES
"Placed discharge outreach phone call to pt s/p ER discharge 12/2/18.  Female answered and stated \"I think you have the wrong number.\"  Discharge outreach letter mailed to address listed on Epic record.  "
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
## 2018-12-06 ENCOUNTER — ROUTINE PRENATAL (OUTPATIENT)
Dept: OBGYN | Facility: CLINIC | Age: 28
End: 2018-12-06
Payer: MEDICAID

## 2018-12-06 VITALS — DIASTOLIC BLOOD PRESSURE: 68 MMHG | SYSTOLIC BLOOD PRESSURE: 98 MMHG | BODY MASS INDEX: 37.12 KG/M2 | WEIGHT: 230 LBS

## 2018-12-06 DIAGNOSIS — O09.91 SUPERVISION OF HIGH RISK PREGNANCY IN FIRST TRIMESTER: Primary | ICD-10-CM

## 2018-12-06 DIAGNOSIS — Z22.330 CARRIER OF GROUP B STREPTOCOCCUS: ICD-10-CM

## 2018-12-06 PROCEDURE — 90040 PR PRENATAL FOLLOW UP: CPT | Performed by: NURSE PRACTITIONER

## 2018-12-06 RX ORDER — ACETAMINOPHEN 325 MG/1
650 TABLET ORAL EVERY 4 HOURS PRN
Qty: 60 TAB | Refills: 0 | Status: ON HOLD | OUTPATIENT
Start: 2018-12-06 | End: 2021-08-31
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
## 2018-12-27 ENCOUNTER — ROUTINE PRENATAL (OUTPATIENT)
Dept: OBGYN | Facility: CLINIC | Age: 28
End: 2018-12-27
Payer: MEDICAID

## 2018-12-27 ENCOUNTER — HOSPITAL ENCOUNTER (OUTPATIENT)
Dept: LAB | Facility: MEDICAL CENTER | Age: 28
End: 2018-12-27
Attending: NURSE PRACTITIONER
Payer: MEDICAID

## 2018-12-27 VITALS — DIASTOLIC BLOOD PRESSURE: 68 MMHG | SYSTOLIC BLOOD PRESSURE: 112 MMHG | BODY MASS INDEX: 37.28 KG/M2 | WEIGHT: 231 LBS

## 2018-12-27 DIAGNOSIS — Z34.82 ENCOUNTER FOR SUPERVISION OF OTHER NORMAL PREGNANCY, SECOND TRIMESTER: Primary | ICD-10-CM

## 2018-12-27 DIAGNOSIS — Z34.82 ENCOUNTER FOR SUPERVISION OF OTHER NORMAL PREGNANCY, SECOND TRIMESTER: ICD-10-CM

## 2018-12-27 PROCEDURE — 90040 PR PRENATAL FOLLOW UP: CPT | Performed by: NURSE PRACTITIONER

## 2018-12-27 PROCEDURE — 81511 FTL CGEN ABNOR FOUR ANAL: CPT

## 2018-12-27 PROCEDURE — 36415 COLL VENOUS BLD VENIPUNCTURE: CPT

## 2018-12-27 RX ORDER — PNV NO.95/FERROUS FUM/FOLIC AC 28MG-0.8MG
1 TABLET ORAL DAILY
Qty: 90 TAB | Refills: 3 | Status: SHIPPED | OUTPATIENT
Start: 2018-12-27
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
## 2018-12-27 NOTE — PROGRESS NOTES
Pt here today for OB follow up  Pt states no complaints   Reports -   Good # [phone]  Pharmacy Confirmed.  Chaperone offered and not indicated.   Pt has U/S on 1/18/19  Pt would like AFP, ordered today.

## 2018-12-30 LAB
# FETUSES US: NORMAL
AFP MOM SERPL: 0.79
AFP SERPL-MCNC: 24 NG/ML
AGE - REPORTED: 30.5 YR
CURRENT SMOKER: YES
FAMILY MEMBER DISEASES HX: NO
GA METHOD: NORMAL
GA: NORMAL WK
HCG MOM SERPL: 0.87
HCG SERPL-ACNC: NORMAL IU/L
HX OF HEREDITARY DISORDERS: NO
IDDM PATIENT QL: NO
INHIBIN A MOM SERPL: 1.46
INHIBIN A SERPL-MCNC: 267 PG/ML
INTEGRATED SCN PATIENT-IMP: NORMAL
PATHOLOGY STUDY: NORMAL
SPECIMEN DRAWN SERPL: NORMAL
U ESTRIOL MOM SERPL: 1.18
U ESTRIOL SERPL-MCNC: 1.22 NG/ML

## 2019-01-18 ENCOUNTER — APPOINTMENT (OUTPATIENT)
Dept: RADIOLOGY | Facility: IMAGING CENTER | Age: 29
End: 2019-01-18
Attending: NURSE PRACTITIONER
Payer: MEDICAID

## 2019-01-18 DIAGNOSIS — O09.91 SUPERVISION OF HIGH RISK PREGNANCY IN FIRST TRIMESTER: ICD-10-CM

## 2019-01-18 PROCEDURE — 99999 US-OB 2ND 3RD TRI COMPLETE: CPT | Mod: TC | Performed by: NURSE PRACTITIONER

## 2019-01-18 PROCEDURE — 76805 OB US >/= 14 WKS SNGL FETUS: CPT | Mod: 26 | Performed by: OBSTETRICS & GYNECOLOGY

## 2019-01-25 PROBLEM — O09.92 SUPERVISION OF HIGH RISK PREGNANCY IN SECOND TRIMESTER: Status: ACTIVE | Noted: 2018-11-09

## 2019-01-28 ENCOUNTER — ROUTINE PRENATAL (OUTPATIENT)
Dept: OBGYN | Facility: CLINIC | Age: 29
End: 2019-01-28
Payer: MEDICAID

## 2019-01-28 VITALS — DIASTOLIC BLOOD PRESSURE: 70 MMHG | WEIGHT: 233 LBS | BODY MASS INDEX: 37.61 KG/M2 | SYSTOLIC BLOOD PRESSURE: 100 MMHG

## 2019-01-28 DIAGNOSIS — Z3A.22 22 WEEKS GESTATION OF PREGNANCY: ICD-10-CM

## 2019-01-28 DIAGNOSIS — Z98.891 HISTORY OF CESAREAN DELIVERY: ICD-10-CM

## 2019-01-28 DIAGNOSIS — F19.91 HISTORY OF DRUG USE: ICD-10-CM

## 2019-01-28 PROCEDURE — 90040 PR PRENATAL FOLLOW UP: CPT | Performed by: NURSE PRACTITIONER

## 2019-01-28 NOTE — PROGRESS NOTES
SUBJECTIVE:  Pt is a 28 y.o.   at 22w0d  gestation. Presents today for follow-up prenatal care. Reports no issues at this time.  Reports fetal movement. Denies cramping/contractions, bleeding or leaking of fluid. Denies dysuria, headaches, N/V, or other issues at this time. Generally feels well today. Desires repeat c/s, unsure of BTL. Reports no drug use since October and reports no issues with asthma. Stopped smoking cigarettes.     OBJECTIVE:  - See prenatal vitals flow  -   Vitals:    19 1442   BP: 100/70   Weight: 105.7 kg (233 lb)                 ASSESSMENT:   - IUP at 22w0d    - S=D   -   Patient Active Problem List    Diagnosis Date Noted   • Carrier of group B Streptococcus 2018   • Rubella equivocal  2018   • Mild intermittent asthma without complication 2018   • History of drug use 2018   • History of  delivery 2018   • Supervision of high risk pregnancy in second trimester 2018         PLAN:  - S/sx pregnancy and labor warning signs vs general discomforts discussed  - Fetal movements and kick counts reviewed   - Adequate hydration reinforced  - Nutrition/exercise/vitamin education; continued PNV  - Repeat c/s consent signed; unsure of BTL  - Anticipatory guidance given  - RTC in 4 weeks for follow-up prenatal care  - GCT to be done after two weeks; instructions reviewed

## 2019-01-28 NOTE — PROGRESS NOTES
OB f/u. + fetal movement.  No VB, LOF or UC's.  Wt:233lb       BP: 100/70  Good phone # [phone]  Preferred pharmacy confirmed.  AFP is negative and u/s was perfomed

## 2019-02-26 ENCOUNTER — TELEPHONE (OUTPATIENT)
Dept: OBGYN | Facility: CLINIC | Age: 29
End: 2019-02-26

## 2019-02-28 ENCOUNTER — ROUTINE PRENATAL (OUTPATIENT)
Dept: OBGYN | Facility: CLINIC | Age: 29
End: 2019-02-28
Payer: MEDICAID

## 2019-02-28 VITALS — WEIGHT: 237 LBS | BODY MASS INDEX: 38.25 KG/M2 | SYSTOLIC BLOOD PRESSURE: 120 MMHG | DIASTOLIC BLOOD PRESSURE: 68 MMHG

## 2019-02-28 DIAGNOSIS — O09.92 ENCOUNTER FOR SUPERVISION OF HIGH RISK PREGNANCY IN SECOND TRIMESTER, ANTEPARTUM: Primary | ICD-10-CM

## 2019-02-28 PROCEDURE — 90040 PR PRENATAL FOLLOW UP: CPT | Performed by: NURSE PRACTITIONER

## 2019-02-28 NOTE — PROGRESS NOTES
S:  Pt is  at 26w3d for routine OB follow up.  Reports pain and numbness in her hands, radiating up to her elbows.  Reports good FM.  Denies VB, LOF, RUCs or vaginal DC. Has not gotten 1 hr GTT drawn, says she will do tomorrow.    O:  Please see above vitals.        FHTs: 140        Fundal ht: 28 cm.        S=D           A:  IUP at 26w3d  Patient Active Problem List    Diagnosis Date Noted   • Carrier of group B Streptococcus 2018   • Rubella equivocal  2018   • Mild intermittent asthma without complication 2018   • History of drug use 2018   • History of  delivery 2018   • Supervision of high risk pregnancy in second trimester 2018        P:  1.  PP contraception: unsure.        2.  Continue FKCs.          3.  Questions answered.          4.  Encouraged pt to tour L&D.          5.  Encourage adequate water intake.        6.  F/u 2 wks.        7.  Discussed comfort measures for carpal tunnel: heat, Tylenol, wrist splints        8.  1 hr GTT to be drawn tomorrow

## 2019-02-28 NOTE — PROGRESS NOTES
OB f/u. + fetal movement.  No VB, LOF or UC's.  Wt: 237lb      BP: 120/68  Good phone # [phone]  Preferred pharmacy confirmed.  Labs not done yet, will do it tomorrow.  C/o hands getting numb and tingly

## 2019-03-18 PROBLEM — O09.93 SUPERVISION OF HIGH RISK PREGNANCY IN THIRD TRIMESTER: Status: ACTIVE | Noted: 2018-11-09

## 2019-04-09 ENCOUNTER — ROUTINE PRENATAL (OUTPATIENT)
Dept: OBGYN | Facility: CLINIC | Age: 29
End: 2019-04-09
Payer: MEDICAID

## 2019-04-09 VITALS — WEIGHT: 240 LBS | SYSTOLIC BLOOD PRESSURE: 102 MMHG | DIASTOLIC BLOOD PRESSURE: 68 MMHG | BODY MASS INDEX: 38.74 KG/M2

## 2019-04-09 DIAGNOSIS — O98.119 SYPHILIS AFFECTING PREGNANCY, ANTEPARTUM: Primary | ICD-10-CM

## 2019-04-09 PROCEDURE — 90040 PR PRENATAL FOLLOW UP: CPT | Performed by: NURSE PRACTITIONER

## 2019-04-09 PROCEDURE — 90471 IMMUNIZATION ADMIN: CPT | Performed by: NURSE PRACTITIONER

## 2019-04-09 PROCEDURE — 90715 TDAP VACCINE 7 YRS/> IM: CPT | Performed by: NURSE PRACTITIONER

## 2019-04-09 NOTE — PROGRESS NOTES
SUBJECTIVE:  Pt is a 28 y.o.   at 32w1d  gestation. Presents today for follow-up prenatal care. Reports no issues at this time.  Reports feeling good  fetal movement. Denies cramping/contractions, bleeding or leaking of fluid. Denies dysuria, headaches, N/V. Generally feels well today. Recent ER or L&D visits  - none.     OBJECTIVE:  - See prenatal vitals flow  -   Vitals:    19 1115   BP: 102/68   Weight: 108.9 kg (240 lb)      Fundal Height - 34    FHT - 155  US normal fetal survey   Prenatal labs rpr 1:1 titer. GBS positive.              ASSESSMENT:   - IUP at 32w1d    - S=D   -   Patient Active Problem List    Diagnosis Date Noted   • Syphilis affecting pregnancy 2019   • Carrier of group B Streptococcus 2018   • Rubella equivocal  2018   • Mild intermittent asthma without complication 2018   • History of drug use 2018   • History of  delivery 2018   • Supervision of high risk pregnancy in third trimester 2018         PLAN:  - S/sx pregnancy and labor warning signs vs general discomforts discussed  - Fetal movements and kick counts reviewed   - Adequate hydration reinforced  - Nutrition/exercise/vitamin education; continued PNV  - update syphilis titer  tdap today  Gbs positive treat in labor.

## 2019-04-09 NOTE — PROGRESS NOTES
Pt here today for OB follow up  Pt states no complaints   Reports +FM  Good # [phone]  Pharmacy Confirmed.  +GBS in urine  Chaperone offered and not indicated.   Pt given ANGELA sheet and instructions.  Pt unsure about BTL, ask at next visit.   Pt would like TDAP vaccine, consent signed.  Tdap vaccine given. Left  Deltoid. VIS given and screening check list reviewed with pt. Verified with TK.  Pt states will have Labs done by next appt

## 2019-04-09 NOTE — LETTER
"Count Your Baby's Movements  Another step to a healthy delivery    Opaltung Lopez             Dept: [phone]    How Many Weeks Pregnant? 32w1d    Date to Begin Countin19              How to use this chart    One way for your physician to keep track of your baby's health is by knowing how often the baby moves (or \"kicks\") in your womb.  You can help your physician to do this by using this chart every day.    Every day, you should see how many hours it takes for your baby to move 10 times.  Start in the morning, as soon as you get up.    · First, write down the time your baby moves until you get to 10.  · Check off one box every time your baby moves until you get to 10.  · Write down the time you finished counting in the last column.  · Total how long it took to count up all 10 movements.  · Finally, fill in the box that shows how long this took.  After counting 10 movements, you no longer have to count any more that day.  The next morning, just start counting again as soon as you get up.    What should you call a \"movement\"?  It is hard to say, because it will feel different from one mother to another and from one pregnancy to the next.  The important thing is that you count the movements the same way throughout your pregnancy.  If you have more questions, you should ask your physician.    Count carefully every day!  SAMPLE:  Week 28    How many hours did it take to feel 10 movements?       Start  Time     1     2     3     4     5     6     7     8     9     10   Finish Time   Mon 8:20 ·  ·  ·  ·  ·  ·  ·  ·  ·  ·  11:40                  Sat               Sun                 IMPORTANT: You should contact your physician if it takes more than two hours for you to feel 10 movements.  Each morning, write down the time and start to count the movements of your baby.  Keep track by checking off one box every time you feel one movement.  When you have " "felt 10 \"kicks\", write down the time you finished counting in the last column.  Then fill in the   box (over the check rudy) for the number of hours it took.  Be sure to read the complete instructions on the previous page.            "

## 2019-05-02 ENCOUNTER — ROUTINE PRENATAL (OUTPATIENT)
Dept: OBGYN | Facility: CLINIC | Age: 29
End: 2019-05-02
Payer: MEDICAID

## 2019-05-02 VITALS — BODY MASS INDEX: 38.9 KG/M2 | SYSTOLIC BLOOD PRESSURE: 106 MMHG | DIASTOLIC BLOOD PRESSURE: 58 MMHG | WEIGHT: 241 LBS

## 2019-05-02 DIAGNOSIS — F19.91 HISTORY OF DRUG USE: ICD-10-CM

## 2019-05-02 DIAGNOSIS — Z98.891 HISTORY OF CESAREAN DELIVERY: ICD-10-CM

## 2019-05-02 DIAGNOSIS — O09.93 SUPERVISION OF HIGH RISK PREGNANCY IN THIRD TRIMESTER: Primary | ICD-10-CM

## 2019-05-02 PROCEDURE — 90040 PR PRENATAL FOLLOW UP: CPT | Performed by: NURSE PRACTITIONER

## 2019-05-02 NOTE — PROGRESS NOTES
"Pt here today for OB follow up  Reports +FM  WT: 241 lb  BP: 106/58  Pt states she gets \"sharp pains\" in vagina when she walks. Pt also reports pain on her left upper belly as she stands up after sitting down.   1 hr ggt labs not done yet. Pt states she has appt for tomorrow to get blood work done.   Good # [phone]    "
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
## 2019-05-30 ENCOUNTER — ANESTHESIA EVENT (OUTPATIENT)
Dept: OBGYN | Facility: MEDICAL CENTER | Age: 29
End: 2019-05-30
Payer: MEDICAID

## 2019-05-31 ENCOUNTER — HOSPITAL ENCOUNTER (INPATIENT)
Facility: MEDICAL CENTER | Age: 29
LOS: 3 days | End: 2019-06-03
Attending: OBSTETRICS & GYNECOLOGY | Admitting: OBSTETRICS & GYNECOLOGY
Payer: MEDICAID

## 2019-05-31 ENCOUNTER — ANESTHESIA (OUTPATIENT)
Dept: OBGYN | Facility: MEDICAL CENTER | Age: 29
End: 2019-05-31
Payer: MEDICAID

## 2019-05-31 PROBLEM — E66.9 OBESITY: Status: ACTIVE | Noted: 2019-05-31

## 2019-05-31 LAB
AMPHET UR QL SCN: NEGATIVE
BARBITURATES UR QL SCN: NEGATIVE
BASOPHILS # BLD AUTO: 1 % (ref 0–1.8)
BASOPHILS # BLD: 0.1 K/UL (ref 0–0.12)
BENZODIAZ UR QL SCN: NEGATIVE
BZE UR QL SCN: NEGATIVE
CANNABINOIDS UR QL SCN: NEGATIVE
EOSINOPHIL # BLD AUTO: 0.14 K/UL (ref 0–0.51)
EOSINOPHIL NFR BLD: 1.4 % (ref 0–6.9)
ERYTHROCYTE [DISTWIDTH] IN BLOOD BY AUTOMATED COUNT: 46.9 FL (ref 35.9–50)
ERYTHROCYTE [DISTWIDTH] IN BLOOD BY AUTOMATED COUNT: 47.2 FL (ref 35.9–50)
HCT VFR BLD AUTO: 33.2 % (ref 37–47)
HCT VFR BLD AUTO: 34.5 % (ref 37–47)
HGB BLD-MCNC: 10.1 G/DL (ref 12–16)
HGB BLD-MCNC: 10.4 G/DL (ref 12–16)
HOLDING TUBE BB 8507: NORMAL
IMM GRANULOCYTES # BLD AUTO: 0.08 K/UL (ref 0–0.11)
IMM GRANULOCYTES NFR BLD AUTO: 0.8 % (ref 0–0.9)
LYMPHOCYTES # BLD AUTO: 2.06 K/UL (ref 1–4.8)
LYMPHOCYTES NFR BLD: 20.9 % (ref 22–41)
MCH RBC QN AUTO: 22.9 PG (ref 27–33)
MCH RBC QN AUTO: 23.4 PG (ref 27–33)
MCHC RBC AUTO-ENTMCNC: 30.1 G/DL (ref 33.6–35)
MCHC RBC AUTO-ENTMCNC: 30.4 G/DL (ref 33.6–35)
MCV RBC AUTO: 76 FL (ref 81.4–97.8)
MCV RBC AUTO: 77 FL (ref 81.4–97.8)
METHADONE UR QL SCN: NEGATIVE
MONOCYTES # BLD AUTO: 0.5 K/UL (ref 0–0.85)
MONOCYTES NFR BLD AUTO: 5.1 % (ref 0–13.4)
NEUTROPHILS # BLD AUTO: 6.97 K/UL (ref 2–7.15)
NEUTROPHILS NFR BLD: 70.8 % (ref 44–72)
NRBC # BLD AUTO: 0 K/UL
NRBC BLD-RTO: 0 /100 WBC
OPIATES UR QL SCN: NEGATIVE
OXYCODONE UR QL SCN: NEGATIVE
PCP UR QL SCN: NEGATIVE
PLATELET # BLD AUTO: 245 K/UL (ref 164–446)
PLATELET # BLD AUTO: 288 K/UL (ref 164–446)
PMV BLD AUTO: 10 FL (ref 9–12.9)
PMV BLD AUTO: 9.9 FL (ref 9–12.9)
PROPOXYPH UR QL SCN: NEGATIVE
RBC # BLD AUTO: 4.31 M/UL (ref 4.2–5.4)
RBC # BLD AUTO: 4.54 M/UL (ref 4.2–5.4)
RPR SER QL: NON REACTIVE
TREPONEMA PALLIDUM IGG+IGM AB [PRESENCE] IN SERUM OR PLASMA BY IMMUNOASSAY: REACTIVE
WBC # BLD AUTO: 16.4 K/UL (ref 4.8–10.8)
WBC # BLD AUTO: 9.9 K/UL (ref 4.8–10.8)

## 2019-05-31 PROCEDURE — 86592 SYPHILIS TEST NON-TREP QUAL: CPT

## 2019-05-31 PROCEDURE — 700102 HCHG RX REV CODE 250 W/ 637 OVERRIDE(OP): Performed by: ANESTHESIOLOGY

## 2019-05-31 PROCEDURE — 36415 COLL VENOUS BLD VENIPUNCTURE: CPT

## 2019-05-31 PROCEDURE — 305385 HCHG SURGICAL SERVICES 1/4 HOUR: Performed by: OBSTETRICS & GYNECOLOGY

## 2019-05-31 PROCEDURE — 700111 HCHG RX REV CODE 636 W/ 250 OVERRIDE (IP): Performed by: ANESTHESIOLOGY

## 2019-05-31 PROCEDURE — 700111 HCHG RX REV CODE 636 W/ 250 OVERRIDE (IP): Performed by: OBSTETRICS & GYNECOLOGY

## 2019-05-31 PROCEDURE — 306288 HCHG RETRACTOR C SECTION LG

## 2019-05-31 PROCEDURE — 85025 COMPLETE CBC W/AUTO DIFF WBC: CPT

## 2019-05-31 PROCEDURE — 90707 MMR VACCINE SC: CPT

## 2019-05-31 PROCEDURE — 700105 HCHG RX REV CODE 258: Performed by: ANESTHESIOLOGY

## 2019-05-31 PROCEDURE — A9270 NON-COVERED ITEM OR SERVICE: HCPCS | Performed by: ANESTHESIOLOGY

## 2019-05-31 PROCEDURE — 304964 HCHG RECOVERY ROOM TIME 1HR: Performed by: OBSTETRICS & GYNECOLOGY

## 2019-05-31 PROCEDURE — 90471 IMMUNIZATION ADMIN: CPT

## 2019-05-31 PROCEDURE — 700101 HCHG RX REV CODE 250: Performed by: ANESTHESIOLOGY

## 2019-05-31 PROCEDURE — 88307 TISSUE EXAM BY PATHOLOGIST: CPT

## 2019-05-31 PROCEDURE — 86780 TREPONEMA PALLIDUM: CPT

## 2019-05-31 PROCEDURE — 59514 CESAREAN DELIVERY ONLY: CPT | Mod: 80

## 2019-05-31 PROCEDURE — 87116 MYCOBACTERIA CULTURE: CPT

## 2019-05-31 PROCEDURE — 87206 SMEAR FLUORESCENT/ACID STAI: CPT

## 2019-05-31 PROCEDURE — 59515 CESAREAN DELIVERY: CPT | Performed by: OBSTETRICS & GYNECOLOGY

## 2019-05-31 PROCEDURE — 80307 DRUG TEST PRSMV CHEM ANLYZR: CPT

## 2019-05-31 PROCEDURE — 700111 HCHG RX REV CODE 636 W/ 250 OVERRIDE (IP)

## 2019-05-31 PROCEDURE — 85027 COMPLETE CBC AUTOMATED: CPT

## 2019-05-31 PROCEDURE — 87015 SPECIMEN INFECT AGNT CONCNTJ: CPT

## 2019-05-31 PROCEDURE — 59425 ANTEPARTUM CARE ONLY: CPT | Performed by: NURSE PRACTITIONER

## 2019-05-31 PROCEDURE — 59514 CESAREAN DELIVERY ONLY: CPT

## 2019-05-31 PROCEDURE — 306828 HCHG ANES-TIME GENERAL: Performed by: OBSTETRICS & GYNECOLOGY

## 2019-05-31 PROCEDURE — 770002 HCHG ROOM/CARE - OB PRIVATE (112)

## 2019-05-31 RX ORDER — DIPHENHYDRAMINE HYDROCHLORIDE 50 MG/ML
25 INJECTION INTRAMUSCULAR; INTRAVENOUS EVERY 6 HOURS PRN
Status: DISCONTINUED | OUTPATIENT
Start: 2019-05-31 | End: 2019-06-01

## 2019-05-31 RX ORDER — SODIUM CHLORIDE, SODIUM LACTATE, POTASSIUM CHLORIDE, CALCIUM CHLORIDE 600; 310; 30; 20 MG/100ML; MG/100ML; MG/100ML; MG/100ML
INJECTION, SOLUTION INTRAVENOUS CONTINUOUS
Status: CANCELLED | OUTPATIENT
Start: 2019-05-31

## 2019-05-31 RX ORDER — HYDROMORPHONE HYDROCHLORIDE 1 MG/ML
0.2 INJECTION, SOLUTION INTRAMUSCULAR; INTRAVENOUS; SUBCUTANEOUS
Status: CANCELLED | OUTPATIENT
Start: 2019-05-31

## 2019-05-31 RX ORDER — DIPHENHYDRAMINE HYDROCHLORIDE 50 MG/ML
12.5 INJECTION INTRAMUSCULAR; INTRAVENOUS EVERY 6 HOURS PRN
Status: DISCONTINUED | OUTPATIENT
Start: 2019-05-31 | End: 2019-06-01

## 2019-05-31 RX ORDER — DOCUSATE SODIUM 100 MG/1
100 CAPSULE, LIQUID FILLED ORAL 2 TIMES DAILY PRN
Status: DISCONTINUED | OUTPATIENT
Start: 2019-05-31 | End: 2019-06-03 | Stop reason: HOSPADM

## 2019-05-31 RX ORDER — ONDANSETRON 2 MG/ML
4 INJECTION INTRAMUSCULAR; INTRAVENOUS
Status: CANCELLED | OUTPATIENT
Start: 2019-05-31

## 2019-05-31 RX ORDER — OXYCODONE HYDROCHLORIDE 10 MG/1
10 TABLET ORAL EVERY 4 HOURS PRN
Status: DISCONTINUED | OUTPATIENT
Start: 2019-05-31 | End: 2019-06-01

## 2019-05-31 RX ORDER — MISOPROSTOL 200 UG/1
800 TABLET ORAL
Status: CANCELLED | OUTPATIENT
Start: 2019-05-31

## 2019-05-31 RX ORDER — METHYLERGONOVINE MALEATE 0.2 MG/ML
0.2 INJECTION INTRAVENOUS
Status: CANCELLED | OUTPATIENT
Start: 2019-05-31

## 2019-05-31 RX ORDER — SODIUM CHLORIDE, SODIUM GLUCONATE, SODIUM ACETATE, POTASSIUM CHLORIDE AND MAGNESIUM CHLORIDE 526; 502; 368; 37; 30 MG/100ML; MG/100ML; MG/100ML; MG/100ML; MG/100ML
INJECTION, SOLUTION INTRAVENOUS
Status: DISCONTINUED | OUTPATIENT
Start: 2019-05-31 | End: 2019-05-31 | Stop reason: SURG

## 2019-05-31 RX ORDER — ONDANSETRON 2 MG/ML
4 INJECTION INTRAMUSCULAR; INTRAVENOUS EVERY 6 HOURS PRN
Status: DISCONTINUED | OUTPATIENT
Start: 2019-05-31 | End: 2019-06-01

## 2019-05-31 RX ORDER — DEXAMETHASONE SODIUM PHOSPHATE 4 MG/ML
INJECTION, SOLUTION INTRA-ARTICULAR; INTRALESIONAL; INTRAMUSCULAR; INTRAVENOUS; SOFT TISSUE PRN
Status: DISCONTINUED | OUTPATIENT
Start: 2019-05-31 | End: 2019-05-31 | Stop reason: SURG

## 2019-05-31 RX ORDER — HYDROMORPHONE HYDROCHLORIDE 1 MG/ML
0.1 INJECTION, SOLUTION INTRAMUSCULAR; INTRAVENOUS; SUBCUTANEOUS
Status: CANCELLED | OUTPATIENT
Start: 2019-05-31

## 2019-05-31 RX ORDER — OXYCODONE HCL 5 MG/5 ML
10 SOLUTION, ORAL ORAL
Status: CANCELLED | OUTPATIENT
Start: 2019-05-31

## 2019-05-31 RX ORDER — HYDROMORPHONE HYDROCHLORIDE 1 MG/ML
0.4 INJECTION, SOLUTION INTRAMUSCULAR; INTRAVENOUS; SUBCUTANEOUS
Status: CANCELLED | OUTPATIENT
Start: 2019-05-31

## 2019-05-31 RX ORDER — SODIUM CHLORIDE, SODIUM LACTATE, POTASSIUM CHLORIDE, CALCIUM CHLORIDE 600; 310; 30; 20 MG/100ML; MG/100ML; MG/100ML; MG/100ML
INJECTION, SOLUTION INTRAVENOUS PRN
Status: DISCONTINUED | OUTPATIENT
Start: 2019-05-31 | End: 2019-06-03 | Stop reason: HOSPADM

## 2019-05-31 RX ORDER — ACETAMINOPHEN 500 MG
1000 TABLET ORAL EVERY 6 HOURS
Status: COMPLETED | OUTPATIENT
Start: 2019-05-31 | End: 2019-06-01

## 2019-05-31 RX ORDER — METOCLOPRAMIDE HYDROCHLORIDE 5 MG/ML
10 INJECTION INTRAMUSCULAR; INTRAVENOUS ONCE
Status: COMPLETED | OUTPATIENT
Start: 2019-05-31 | End: 2019-05-31

## 2019-05-31 RX ORDER — OXYCODONE HYDROCHLORIDE 5 MG/1
5 TABLET ORAL EVERY 4 HOURS PRN
Status: DISCONTINUED | OUTPATIENT
Start: 2019-05-31 | End: 2019-06-01

## 2019-05-31 RX ORDER — OXYCODONE HCL 5 MG/5 ML
5 SOLUTION, ORAL ORAL
Status: CANCELLED | OUTPATIENT
Start: 2019-05-31

## 2019-05-31 RX ORDER — MISOPROSTOL 200 UG/1
800 TABLET ORAL
Status: DISCONTINUED | OUTPATIENT
Start: 2019-05-31 | End: 2019-06-03 | Stop reason: HOSPADM

## 2019-05-31 RX ORDER — SIMETHICONE 80 MG
80 TABLET,CHEWABLE ORAL 4 TIMES DAILY PRN
Status: DISCONTINUED | OUTPATIENT
Start: 2019-05-31 | End: 2019-06-03 | Stop reason: HOSPADM

## 2019-05-31 RX ORDER — MEPERIDINE HYDROCHLORIDE 25 MG/ML
6.25 INJECTION INTRAMUSCULAR; INTRAVENOUS; SUBCUTANEOUS
Status: CANCELLED | OUTPATIENT
Start: 2019-05-31

## 2019-05-31 RX ORDER — KETOROLAC TROMETHAMINE 30 MG/ML
INJECTION, SOLUTION INTRAMUSCULAR; INTRAVENOUS PRN
Status: DISCONTINUED | OUTPATIENT
Start: 2019-05-31 | End: 2019-05-31 | Stop reason: SURG

## 2019-05-31 RX ORDER — SCOLOPAMINE TRANSDERMAL SYSTEM 1 MG/1
PATCH, EXTENDED RELEASE TRANSDERMAL PRN
Status: DISCONTINUED | OUTPATIENT
Start: 2019-05-31 | End: 2019-05-31 | Stop reason: SURG

## 2019-05-31 RX ORDER — KETOROLAC TROMETHAMINE 30 MG/ML
30 INJECTION, SOLUTION INTRAMUSCULAR; INTRAVENOUS EVERY 6 HOURS
Status: DISCONTINUED | OUTPATIENT
Start: 2019-05-31 | End: 2019-06-01

## 2019-05-31 RX ORDER — HYDROMORPHONE HYDROCHLORIDE 1 MG/ML
0.4 INJECTION, SOLUTION INTRAMUSCULAR; INTRAVENOUS; SUBCUTANEOUS
Status: DISCONTINUED | OUTPATIENT
Start: 2019-05-31 | End: 2019-06-01

## 2019-05-31 RX ORDER — MORPHINE SULFATE 0.5 MG/ML
INJECTION, SOLUTION EPIDURAL; INTRATHECAL; INTRAVENOUS PRN
Status: DISCONTINUED | OUTPATIENT
Start: 2019-05-31 | End: 2019-05-31 | Stop reason: SURG

## 2019-05-31 RX ORDER — HYDROMORPHONE HYDROCHLORIDE 1 MG/ML
0.2 INJECTION, SOLUTION INTRAMUSCULAR; INTRAVENOUS; SUBCUTANEOUS
Status: DISCONTINUED | OUTPATIENT
Start: 2019-05-31 | End: 2019-06-01

## 2019-05-31 RX ORDER — CITRIC ACID/SODIUM CITRATE 334-500MG
30 SOLUTION, ORAL ORAL ONCE
Status: COMPLETED | OUTPATIENT
Start: 2019-05-31 | End: 2019-05-31

## 2019-05-31 RX ORDER — SODIUM CHLORIDE, SODIUM GLUCONATE, SODIUM ACETATE, POTASSIUM CHLORIDE AND MAGNESIUM CHLORIDE 526; 502; 368; 37; 30 MG/100ML; MG/100ML; MG/100ML; MG/100ML; MG/100ML
1500 INJECTION, SOLUTION INTRAVENOUS ONCE
Status: COMPLETED | OUTPATIENT
Start: 2019-05-31 | End: 2019-05-31

## 2019-05-31 RX ORDER — CEFAZOLIN SODIUM 1 G/3ML
1 INJECTION, POWDER, FOR SOLUTION INTRAMUSCULAR; INTRAVENOUS ONCE
Status: COMPLETED | OUTPATIENT
Start: 2019-05-31 | End: 2019-05-31

## 2019-05-31 RX ORDER — ONDANSETRON 2 MG/ML
INJECTION INTRAMUSCULAR; INTRAVENOUS PRN
Status: DISCONTINUED | OUTPATIENT
Start: 2019-05-31 | End: 2019-05-31 | Stop reason: SURG

## 2019-05-31 RX ORDER — HALOPERIDOL 5 MG/ML
1 INJECTION INTRAMUSCULAR
Status: CANCELLED | OUTPATIENT
Start: 2019-05-31

## 2019-05-31 RX ORDER — PHENYLEPHRINE HYDROCHLORIDE 10 MG/ML
INJECTION, SOLUTION INTRAMUSCULAR; INTRAVENOUS; SUBCUTANEOUS PRN
Status: DISCONTINUED | OUTPATIENT
Start: 2019-05-31 | End: 2019-05-31 | Stop reason: SURG

## 2019-05-31 RX ORDER — BUPIVACAINE HYDROCHLORIDE 7.5 MG/ML
INJECTION, SOLUTION INTRASPINAL PRN
Status: DISCONTINUED | OUTPATIENT
Start: 2019-05-31 | End: 2019-05-31 | Stop reason: SURG

## 2019-05-31 RX ORDER — DIPHENHYDRAMINE HYDROCHLORIDE 50 MG/ML
12.5 INJECTION INTRAMUSCULAR; INTRAVENOUS
Status: CANCELLED | OUTPATIENT
Start: 2019-05-31

## 2019-05-31 RX ORDER — SODIUM CHLORIDE, SODIUM LACTATE, POTASSIUM CHLORIDE, CALCIUM CHLORIDE 600; 310; 30; 20 MG/100ML; MG/100ML; MG/100ML; MG/100ML
INJECTION, SOLUTION INTRAVENOUS CONTINUOUS
Status: DISCONTINUED | OUTPATIENT
Start: 2019-05-31 | End: 2019-06-03 | Stop reason: HOSPADM

## 2019-05-31 RX ADMIN — PHENYLEPHRINE HYDROCHLORIDE 100 MCG: 10 INJECTION INTRAVENOUS at 10:00

## 2019-05-31 RX ADMIN — ACETAMINOPHEN 1000 MG: 500 TABLET ORAL at 19:11

## 2019-05-31 RX ADMIN — OXYCODONE HYDROCHLORIDE 10 MG: 10 TABLET ORAL at 22:02

## 2019-05-31 RX ADMIN — KETOROLAC TROMETHAMINE 30 MG: 30 INJECTION, SOLUTION INTRAMUSCULAR at 10:38

## 2019-05-31 RX ADMIN — SODIUM CITRATE AND CITRIC ACID MONOHYDRATE 30 ML: 500; 334 SOLUTION ORAL at 09:33

## 2019-05-31 RX ADMIN — PHENYLEPHRINE HYDROCHLORIDE 100 MCG: 10 INJECTION INTRAVENOUS at 09:57

## 2019-05-31 RX ADMIN — PHENYLEPHRINE HYDROCHLORIDE 100 MCG: 10 INJECTION INTRAVENOUS at 10:27

## 2019-05-31 RX ADMIN — BUPIVACAINE HYDROCHLORIDE IN DEXTROSE 1.5 ML: 7.5 INJECTION, SOLUTION SUBARACHNOID at 09:44

## 2019-05-31 RX ADMIN — PHENYLEPHRINE HYDROCHLORIDE 100 MCG: 10 INJECTION INTRAVENOUS at 10:05

## 2019-05-31 RX ADMIN — DEXAMETHASONE SODIUM PHOSPHATE 8 MG: 4 INJECTION, SOLUTION INTRA-ARTICULAR; INTRALESIONAL; INTRAMUSCULAR; INTRAVENOUS; SOFT TISSUE at 10:08

## 2019-05-31 RX ADMIN — OXYCODONE HYDROCHLORIDE 5 MG: 5 TABLET ORAL at 13:17

## 2019-05-31 RX ADMIN — KETOROLAC TROMETHAMINE 30 MG: 30 INJECTION, SOLUTION INTRAMUSCULAR; INTRAVENOUS at 23:26

## 2019-05-31 RX ADMIN — PHENYLEPHRINE HYDROCHLORIDE 100 MCG: 10 INJECTION INTRAVENOUS at 09:53

## 2019-05-31 RX ADMIN — OXYCODONE HYDROCHLORIDE 10 MG: 10 TABLET ORAL at 17:33

## 2019-05-31 RX ADMIN — OXYTOCIN 200 ML: 10 INJECTION, SOLUTION INTRAMUSCULAR; INTRAVENOUS at 10:49

## 2019-05-31 RX ADMIN — CEFAZOLIN 2 G: 330 INJECTION, POWDER, FOR SOLUTION INTRAMUSCULAR; INTRAVENOUS at 09:47

## 2019-05-31 RX ADMIN — ONDANSETRON 4 MG: 2 INJECTION INTRAMUSCULAR; INTRAVENOUS at 10:08

## 2019-05-31 RX ADMIN — METOCLOPRAMIDE 10 MG: 5 INJECTION, SOLUTION INTRAMUSCULAR; INTRAVENOUS at 09:33

## 2019-05-31 RX ADMIN — FAMOTIDINE 20 MG: 10 INJECTION INTRAVENOUS at 09:33

## 2019-05-31 RX ADMIN — MEASLES, MUMPS, AND RUBELLA VIRUS VACCINE LIVE 0.5 ML: 1000; 12500; 1000 INJECTION, POWDER, LYOPHILIZED, FOR SUSPENSION SUBCUTANEOUS at 21:58

## 2019-05-31 RX ADMIN — OXYTOCIN 500 ML: 10 INJECTION, SOLUTION INTRAMUSCULAR; INTRAVENOUS at 10:08

## 2019-05-31 RX ADMIN — MORPHINE SULFATE 0.15 MG: 0.5 INJECTION, SOLUTION EPIDURAL; INTRATHECAL; INTRAVENOUS at 09:44

## 2019-05-31 RX ADMIN — SCOPALAMINE 1 PATCH: 1 PATCH, EXTENDED RELEASE TRANSDERMAL at 10:09

## 2019-05-31 RX ADMIN — ACETAMINOPHEN 1000 MG: 500 TABLET ORAL at 13:17

## 2019-05-31 RX ADMIN — FENTANYL CITRATE 10 MCG: 50 INJECTION, SOLUTION INTRAMUSCULAR; INTRAVENOUS at 09:44

## 2019-05-31 RX ADMIN — Medication 20 UNITS: at 11:24

## 2019-05-31 RX ADMIN — KETOROLAC TROMETHAMINE 30 MG: 30 INJECTION, SOLUTION INTRAMUSCULAR; INTRAVENOUS at 16:21

## 2019-05-31 RX ADMIN — SODIUM CHLORIDE, SODIUM GLUCONATE, SODIUM ACETATE, POTASSIUM CHLORIDE AND MAGNESIUM CHLORIDE: 526; 502; 368; 37; 30 INJECTION, SOLUTION INTRAVENOUS at 10:00

## 2019-05-31 RX ADMIN — SODIUM CHLORIDE, SODIUM GLUCONATE, SODIUM ACETATE, POTASSIUM CHLORIDE AND MAGNESIUM CHLORIDE 1500 ML: 526; 502; 368; 37; 30 INJECTION, SOLUTION INTRAVENOUS at 08:00

## 2019-05-31 RX ADMIN — SODIUM CHLORIDE, SODIUM GLUCONATE, SODIUM ACETATE, POTASSIUM CHLORIDE AND MAGNESIUM CHLORIDE: 526; 502; 368; 37; 30 INJECTION, SOLUTION INTRAVENOUS at 09:39

## 2019-05-31 ASSESSMENT — PAIN SCALES - GENERAL: PAIN_LEVEL: 0

## 2019-05-31 ASSESSMENT — PATIENT HEALTH QUESTIONNAIRE - PHQ9
SUM OF ALL RESPONSES TO PHQ9 QUESTIONS 1 AND 2: 0
2. FEELING DOWN, DEPRESSED, IRRITABLE, OR HOPELESS: NOT AT ALL
1. LITTLE INTEREST OR PLEASURE IN DOING THINGS: NOT AT ALL

## 2019-05-31 ASSESSMENT — LIFESTYLE VARIABLES: EVER_SMOKED: NEVER
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
## 2019-05-31 NOTE — ANESTHESIA TIME REPORT
Anesthesia Start and Stop Event Times     Date Time Event    2019 0939 Anesthesia Start     1055 Anesthesia Stop        Responsible Staff  19    Name Role Begin End    Park Rios M.D. Anesth 0939 1055        Preop Diagnosis (Free Text):  Pre-op Diagnosis     HISTORY OF  DELIVERY, 39 +4 WEEKS GESTATION         Preop Diagnosis (Codes):  Diagnosis Information     Diagnosis Code(s):         Post op Diagnosis  Pregnancy      Premium Reason  Non-Premium    Comments:

## 2019-05-31 NOTE — L&D DELIVERY NOTE
OB  Delivery Note    2019  Rhonda Lopez  28 y.o.    Review the Delivery Report for details.     Gestational Age: 39w4d  /Para:   Labor Complications: None   Estimated Blood Loss:   IO Blood Loss  19 0959 - 19 1144    Est. Blood Loss Anesthesia 500 mL    Total  500 mL        Delivery Type: , Low Transverse   ROM to Delivery Time: 0h 01m   Sex: Male  [place] Weight: 3.52 kg (7 lb 12.2 oz)    1 Minute 5 Minute 10 Minute   Apgar Totals: 8    9             Details    Pre-Op Diagnosis: 1. Intrauterine pregnancy at 39w4d  2. Previous C/S x1   3. Hx of Syphilis  ( Treated )    Delivery Justification Patient was admitted to Labor and Delivery at 39w4d for repeat    Post-Op Diagnosis: 1. S/A   Indications:  Repeat    Procedure: Repeat  , Low Transverse  via Low Transverse  incision   Staff Surgeon(s):  Adam Ortiz M.D.  Assistant :   Dr. Nely Kim  PGY-1    * No surgical staff found *   Anesthesia: Spinal    Findings: Male infant delivered, weighing 3.52 kg (7 lb 12.2 oz) . Normal uterus, tubes, and ovaries.     Informed Consent:  The risks, benefits, complications, and alternatives were discussed with the patient. The patient understood that the risks of  section include, but are not limited to: injury to nearby structures or organs, infection, blood loss and possible need for transfusion, and potential need for more surgery including hysterectomy. The patient stated understanding and desired to proceed. All questions were answered. The site of surgery was properly noted and marked. The patient was identified as Rhonda Lopez and the procedure verified as a  delivery. A Time Out was held and the above information confirmed.    Procedure Details:  After adequate Spinal  anesthesia was ascertained, the patient was prepped and draped in a normal supine position with a wedge under the right hip.  A  pfannensteil incision was made and the previous scar was removed.  The incision was taken down to the fascia, which was incised medial to lateral.  The rectus muscles were dissected off the rectus sheath.  There was separation of the rectus sheath superiorly and the peritoneum was entered atraumatically, extended superiorly and inferiorly.  An Johnnie O retractor was placed. The lower uterine segment was attenuated.  A low transverse incision was made in the uterus.  It was then extended digitally and the membranes were entered with clear fluid.  The infant was born in a vertex position.  It was a Living  Male infant.  Placenta removal: Manual Removal . Placental disposition: pathology . The uterus was repaired in situ with an johnnie-o retractor, cleansed of all clots and membranes.  The lower segment was manually dilated for lochia egress.  Attention was made towards closing the uterus in a 2-layer fashion with #1 Chromic suture ligature in a running interlocking stitch with hemostatis assured.  The gutters were cleansed of all clots.  Tubal ligation was not performed.  Normal tubes and ovaries were noted.  The fascia was closed with 0 Vicryl going right to left, left to right, crossing in the midline with a running interlocking stitch.  The subcutaneous tissues was copiously irrigated.  Small capillary bleeders individually coagulated.  The subcutaneous tissues were approximated with interrupted 3-0 Vicryl and the skin with subcuticular suture with 4-0 Vicryl.   Prineo / Dermabond system , with tegaderm  For Dressing    The patient tolerated the procedure well. Sponge, instrument, and needle counts were correct and the patient was taken to the recovery room in good and stable condition.    Adam Ortiz

## 2019-05-31 NOTE — PROGRESS NOTES
0730-Pt arrived for scheduled C/S.  Pt taken to [facility].  EFM and toco monitors applied.  Pt prepped for surgery and consents signed.  0935-Pt ambulatory from [facility] to OR1 with all belongings.  1053-Pt transferred by martha from OR1 to PACU.  Report received from anesthesia.  1200-Pt transferred from PACU to PPU room 340 with all belongings.  Report given to Johana HOWARD.  1301-Sent placenta to pathology.  Spoke with Princess before sending.

## 2019-05-31 NOTE — H&P
OB H&P:    CC: Repeat C/S    HPI:  Ms. Rhonda Lopez is a 28 y.o.  @ 39w4d by LMP c/w 13 and 19 week ultrasounds. She presents for scheduled repeat C/S. Her pregnancy is complicated by + GBS status, h/o methamphetamine use (denies use since 10/2018), and positive RPR. She apparently tested positive while in intermediate and received treatment, described as IM injection x1 followed by oral treatment. She had genital lesions, but does not describe them well. RPR titer 1:1 2018; she was given order for repeat titer during third trimester which has not been done.    First pregnancy delivered via C/S for non-reassuring fetal status.  H/o Mild intermittent asthma. H/o drug use, last use 10/2018    Contractions: Yes , very irregular  Loss of fluid: No   Vaginal bleeding: No   Fetal movement: present    PNC with TPC beginning at 10 weeks    PNL:  RPR Reactive 2018; Treponemal assay reactive. RPR titer 1:1. Patient had positive RPR while incarcerated in  and was treated.   Rubella Non-immune  Rh, HIV neg, , HBsAg NR, Hep C Ab neg, GC/CT neg/neg  Glucola: Not done  GBS + (isolated in urine cx)      ROS:  Const: denies fevers, general concerns  CV/resp: reports no concerns  GI: denies abd pain, GI concerns  : see HPI  Neuro: denies HA/vision changes    OB History    Para Term  AB Living   3 1 1   1 1   SAB TAB Ectopic Molar Multiple Live Births   1         1      # Outcome Date GA Lbr Aubrey/2nd Weight Sex Delivery Anes PTL Lv   3 Current            2 Term 08 40w0d  2.92 kg (6 lb 7 oz) F CS-LTranv Spinal N DELANEY      Birth Comments: C/Section due to unable to progress   1 SAB 10/27/07 4w0d    SAB         Birth Comments: PAssed on its own          GYN: H/o syphilis, denies other STIs, no cervical procedures    No past medical history on file.    Past Surgical History:   Procedure Laterality Date   • PRIMARY C SECTION  2008    done at [facility]   • OTHER      [id number]        No current facility-administered medications on file prior to encounter.      Current Outpatient Prescriptions on File Prior to Encounter   Medication Sig Dispense Refill   • NS SOLN 60 mL with albuterol 2.5 mg/0.5 mL NEBU 5 mL 5 mg/hr by Nebulization route.     • Prenatal Vit-Fe Fumarate-FA (PRENATAL VITAMINS) 28-0.8 MG Tab Take 1 Tab by mouth every day. 90 Tab 3   • acetaminophen (TYLENOL) 325 MG Tab Take 2 Tabs by mouth every four hours as needed. (Patient not taking: Reported on 2019) 60 Tab 0   • cyclobenzaprine (FLEXERIL) 10 MG TABS Take 1 Tab by mouth 3 times a day as needed for Mild Pain. (Patient not taking: Reported on 2018) 30 Tab 0   • ibuprofen (MOTRIN) 600 MG TABS Take 1 Tab by mouth every 6 hours as needed (as needed for pain). (Patient not taking: Reported on 2018) 24 Tab 1       No family history on file.    Social History     Social History   • Marital status: Single     Spouse name: N/A   • Number of children: N/A   • Years of education: N/A     Occupational History   • Not on file.     Social History Main Topics   • Smoking status: Current Every Day Smoker     Packs/day: 0.25     Years: 10.00     Types: Cigarettes   • Smokeless tobacco: Never Used      Comment: Trying to quit smoking   • Alcohol use No   • Drug use: Yes     Types: Methamphetamines      Comment: last used methamphetamines 10/30/2018   • Sexual activity: Not Currently     Partners: Male      Comment: Unplanned pregnancy. was not on birth control     Other Topics Concern   • Not on file     Social History Narrative   • No narrative on file       PE:  There were no vitals filed for this visit.  gen: AAO, NAD  abd: soft, gravid, NT, EFW 3600 g  Ext: NT, no edema    SVE: deferred  FHT: 140/moderate variability/+ accels/ - decels  Karrie: uterine irritability, few possible ctx on toco that patient does not feel    A/P: 28 y.o.  @ 39w4d by LMP who presents for repeat .     # Reactive RPR  H/o RPR,  treated in half-way. Titer in first trimester 1:1, has not had repeat titer.  - Repeat RPR titer    # GBS positive  No signs of SROM    # Social issues  H/o methamphetamine use; patient describes no use during this pregnancy. Patient recently evicted but apparently will be moving back to [place] soon.   - UDS  -  consult postpartum    - Admit to L&D  - Routine pre-op labs and antibiotics  - Anticipate repeat LTCS  - Does not desire BTL or other types of postpartum contraception.    Nely Kim, PGY-1

## 2019-05-31 NOTE — ANESTHESIA POSTPROCEDURE EVALUATION
Patient: Rhonda Lopez    Procedure Summary     Date:  19 Room / Location:  [facility] OR  / LABOR AND DELIVERY    Anesthesia Start:  939 Anesthesia Stop:      Procedure:   SECTION, REPEAT (Abdomen) Diagnosis:  (HISTORY OF  DELIVERY, 39 +4 WEEKS GESTATION )    Surgeon:  Adam Ortiz M.D. Responsible Provider:  Park Rios M.D.    Anesthesia Type:  spinal ASA Status:  3          Final Anesthesia Type: spinal  Last vitals  BP   114/55   Temp     96.7   Pulse   72   Resp     16   SpO2     100%     Anesthesia Post Evaluation    Patient location during evaluation: PACU  Patient participation: complete - patient participated  Level of consciousness: awake and alert  Pain score: 0    Airway patency: patent  Anesthetic complications: no  Cardiovascular status: hemodynamically stable  Respiratory status: acceptable  Hydration status: euvolemic    PONV: none    patient able to participate, but full recovery from regional anesthesia has not occurred and is not expected within the stipulated timeframe for the completion of the evaluation

## 2019-05-31 NOTE — ANESTHESIA PROCEDURE NOTES
Spinal Block  Performed by: CHAYA MONET  Authorized by: CHAYA MONET     Patient Location:  OB  Start Time:  5/31/2019 9:43 AM  End Time:  5/31/2019 9:44 AM  Reason for Block: primary anesthetic    patient identified, IV checked, site marked, risks and benefits discussed, surgical consent, monitors and equipment checked, pre-op evaluation and timeout performed    Patient Position:  Sitting  Prep: ChloraPrep, patient draped and sterile technique    Monitoring:  Blood pressure, continuous pulse oximetry and heart rate  Approach:  Midline  Location:  L3-4  Injection Technique:  Single-shot  Skin infiltration:  Lidocaine  Strength:  1%  Dose:  3ml  Needle Type:  Pencan  Needle Gauge:  25 G  CSF flowing pre/post injection:  Yes  Sensory Level:  T4   Lot #[id number]

## 2019-05-31 NOTE — ANESTHESIA PREPROCEDURE EVALUATION
Relevant Problems   (+) History of  delivery   (+) History of drug use   (+) Mild intermittent asthma without complication       Physical Exam    Airway   Mallampati: I  TM distance: >3 FB  Neck ROM: full       Cardiovascular - normal exam  Rhythm: regular  Rate: normal  (-) murmur     Dental - normal exam         Pulmonary - normal exam  Breath sounds clear to auscultation     Abdominal    Neurological - normal exam               Anesthesia Plan    ASA 3   ASA physical status 3 criteria: alcohol and/or substance dependence or abuse    Plan - spinal   Neuraxial block will be primary anesthetic            Postoperative Plan: Postoperative administration of opioids is intended.    Pertinent diagnostic labs and testing reviewed    Informed Consent:    Anesthetic plan and risks discussed with patient.    Use of blood products discussed with: patient whom consented to blood products.

## 2019-05-31 NOTE — OR SURGEON
Immediate Post OP Note    PreOp Diagnosis: Term Pregnancy                                  Previous C/s x1                                Desires Repeat                                Hx of + Syphilis     PostOp Diagnosis: Term Pregnancy                                  Previous C/s x1                                Desires Repeat                                Hx of + Syphilis       Procedure(s):   SECTION, REPEAT - Wound Class: Clean Contaminated    Surgeon(s):  Adam Ortiz M.D.    Anesthesiologist/Type of Anesthesia:  Anesthesiologist: Park Rios M.D./Spinal    Surgical Staff:  * No surgical staff found *    Specimens removed if any:  * No specimens in log *    Estimated Blood Loss: 500 cc    Findings: viable male , apgar 8/9     Complications: None          2019 11:42 AM Adam Ortiz M.D.

## 2019-05-31 NOTE — ANESTHESIA QCDR
2019 [facility] Clinical Data Registry (for Quality Improvement)     Postoperative nausea/vomiting risk protocol (Adult = 18 yrs and Pediatric 3-17 yrs)- (430 and 463)  General inhalation anesthetic (NOT TIVA) with PONV risk factors: No  Provision of anti-emetic therapy with at least 2 different classes of agents: N/A  Patient DID NOT receive anti-emetic therapy and reason is documented in Medical Record: N/A    Multimodal Pain Management- (AQI59)  Patient undergoing Elective Surgery (i.e. Outpatient, or ASC, or Prescheduled Surgery prior to Hospital Admission): No  Use of Multimodal Pain Management, two or more drugs and/or interventions, NOT including systemic opioids: N/A  Exception: Documented allergy to multiple classes of analgesics: N/A    PACU assessment of acute postoperative pain prior to Anesthesia Care End- Applies to Patients Age = 18- (ABG7)  Initial PACU pain score is which of the following: < 7/10  Patient unable to report pain score: N/A    Post-anesthetic transfer of care checklist/protocol to PACU/ICU- (426 and 427)  Upon conclusion of case, patient transferred to which of the following locations: PACU/Non-ICU  Use of transfer checklist/protocol: Yes  Exclusion: Service Performed in Patient Hospital Room (and thus did not require transfer): N/A    PACU Reintubation- (AQI31)  General anesthesia requiring endotracheal intubation (ETT) along with subsequent extubation in OR or PACU: No  Required reintubation in the PACU: N/A  Extubation was a planned trial documented in the medical record prior to removal of the original airway device: N/A    Unplanned admission to ICU related to anesthesia service up through end of PACU care- (MD51)  Unplanned admission to ICU (not initially anticipated at anesthesia start time): No

## 2019-06-01 PROCEDURE — A9270 NON-COVERED ITEM OR SERVICE: HCPCS | Performed by: OBSTETRICS & GYNECOLOGY

## 2019-06-01 PROCEDURE — 700112 HCHG RX REV CODE 229: Performed by: OBSTETRICS & GYNECOLOGY

## 2019-06-01 PROCEDURE — 770002 HCHG ROOM/CARE - OB PRIVATE (112)

## 2019-06-01 PROCEDURE — 700102 HCHG RX REV CODE 250 W/ 637 OVERRIDE(OP): Performed by: ANESTHESIOLOGY

## 2019-06-01 PROCEDURE — 700102 HCHG RX REV CODE 250 W/ 637 OVERRIDE(OP): Performed by: OBSTETRICS & GYNECOLOGY

## 2019-06-01 PROCEDURE — A9270 NON-COVERED ITEM OR SERVICE: HCPCS | Performed by: ANESTHESIOLOGY

## 2019-06-01 PROCEDURE — 700111 HCHG RX REV CODE 636 W/ 250 OVERRIDE (IP): Performed by: OBSTETRICS & GYNECOLOGY

## 2019-06-01 PROCEDURE — 700111 HCHG RX REV CODE 636 W/ 250 OVERRIDE (IP): Performed by: ANESTHESIOLOGY

## 2019-06-01 RX ORDER — OXYCODONE HYDROCHLORIDE AND ACETAMINOPHEN 5; 325 MG/1; MG/1
1 TABLET ORAL EVERY 4 HOURS PRN
Status: DISCONTINUED | OUTPATIENT
Start: 2019-06-01 | End: 2019-06-03 | Stop reason: HOSPADM

## 2019-06-01 RX ORDER — DIPHENHYDRAMINE HCL 25 MG
25 TABLET ORAL EVERY 6 HOURS PRN
Status: DISCONTINUED | OUTPATIENT
Start: 2019-06-01 | End: 2019-06-03 | Stop reason: HOSPADM

## 2019-06-01 RX ORDER — MORPHINE SULFATE 4 MG/ML
4 INJECTION, SOLUTION INTRAMUSCULAR; INTRAVENOUS
Status: DISCONTINUED | OUTPATIENT
Start: 2019-06-01 | End: 2019-06-03 | Stop reason: HOSPADM

## 2019-06-01 RX ORDER — IBUPROFEN 800 MG/1
800 TABLET ORAL EVERY 8 HOURS PRN
Status: DISCONTINUED | OUTPATIENT
Start: 2019-06-01 | End: 2019-06-03 | Stop reason: HOSPADM

## 2019-06-01 RX ORDER — DIPHENHYDRAMINE HYDROCHLORIDE 50 MG/ML
25 INJECTION INTRAMUSCULAR; INTRAVENOUS EVERY 6 HOURS PRN
Status: DISCONTINUED | OUTPATIENT
Start: 2019-06-01 | End: 2019-06-03 | Stop reason: HOSPADM

## 2019-06-01 RX ORDER — OXYCODONE AND ACETAMINOPHEN 10; 325 MG/1; MG/1
1 TABLET ORAL EVERY 4 HOURS PRN
Status: DISCONTINUED | OUTPATIENT
Start: 2019-06-01 | End: 2019-06-03 | Stop reason: HOSPADM

## 2019-06-01 RX ORDER — ACETAMINOPHEN 325 MG/1
650 TABLET ORAL EVERY 4 HOURS PRN
Status: DISCONTINUED | OUTPATIENT
Start: 2019-06-01 | End: 2019-06-03 | Stop reason: HOSPADM

## 2019-06-01 RX ORDER — ONDANSETRON 2 MG/ML
4 INJECTION INTRAMUSCULAR; INTRAVENOUS EVERY 6 HOURS PRN
Status: DISCONTINUED | OUTPATIENT
Start: 2019-06-01 | End: 2019-06-03 | Stop reason: HOSPADM

## 2019-06-01 RX ORDER — ONDANSETRON 4 MG/1
4 TABLET, ORALLY DISINTEGRATING ORAL EVERY 6 HOURS PRN
Status: DISCONTINUED | OUTPATIENT
Start: 2019-06-01 | End: 2019-06-03 | Stop reason: HOSPADM

## 2019-06-01 RX ADMIN — ACETAMINOPHEN 1000 MG: 500 TABLET ORAL at 08:08

## 2019-06-01 RX ADMIN — SIMETHICONE CHEW TAB 80 MG 80 MG: 80 TABLET ORAL at 01:15

## 2019-06-01 RX ADMIN — OXYCODONE HYDROCHLORIDE AND ACETAMINOPHEN 1 TABLET: 10; 325 TABLET ORAL at 13:42

## 2019-06-01 RX ADMIN — OXYCODONE HYDROCHLORIDE 10 MG: 10 TABLET ORAL at 03:51

## 2019-06-01 RX ADMIN — DOCUSATE SODIUM 100 MG: 100 CAPSULE, LIQUID FILLED ORAL at 01:15

## 2019-06-01 RX ADMIN — ACETAMINOPHEN 1000 MG: 500 TABLET ORAL at 01:15

## 2019-06-01 RX ADMIN — KETOROLAC TROMETHAMINE 30 MG: 30 INJECTION, SOLUTION INTRAMUSCULAR; INTRAVENOUS at 05:18

## 2019-06-01 RX ADMIN — DOCUSATE SODIUM 100 MG: 100 CAPSULE, LIQUID FILLED ORAL at 18:48

## 2019-06-01 RX ADMIN — OXYCODONE HYDROCHLORIDE AND ACETAMINOPHEN 1 TABLET: 10; 325 TABLET ORAL at 17:51

## 2019-06-01 RX ADMIN — OXYCODONE HYDROCHLORIDE 10 MG: 10 TABLET ORAL at 08:08

## 2019-06-01 RX ADMIN — IBUPROFEN 800 MG: 800 TABLET ORAL at 16:22

## 2019-06-01 RX ADMIN — ONDANSETRON 4 MG: 4 TABLET, ORALLY DISINTEGRATING ORAL at 18:46

## 2019-06-01 RX ADMIN — OXYCODONE HYDROCHLORIDE AND ACETAMINOPHEN 1 TABLET: 10; 325 TABLET ORAL at 21:42
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
## 2019-06-01 NOTE — PROGRESS NOTES
Post Partum Progress Note    Name:   Rhonda Lopez   Date/Time:  2019 - 4:32 AM  Chief Admitting Dx:  Pregnancy  HISTORY OF  DELIVERY, 39 +4 WEEKS GESTATION   Labor and delivery, indication for care  Labor and delivery, indication for care  Delivery Type:   for repeat  Post-Op/Post Partum Days #:  1    Subjective:  Abdominal pain: no  Ambulating:   yes  Tolerating liquids:  yes  Tolerating food:  yes common adult  Flatus:   yes  BM:    no  Bleeding:   without any bleeding  Voiding:   yes  Dizziness:   no  Feeding:   Breast and similac formula    Vitals:    19 2300 19 0000 19 0100 19 0200   BP:    108/52   Pulse: 64 72 64 79   Resp: 18 18 18 20   Temp:    36.6 °C (97.8 °F)   TempSrc:    Oral   SpO2: 97% 97% 97% 94%   Weight:       Height:           Exam:  Breast: Tenderness no, Engorged no and Lactating yes  Abdomen: Abdomen soft, non-tender. BS normal. No masses,  No organomegaly  Fundal Tenderness:  no  Fundus Firm: yes  Incision: dry and intact  Below umbilicus: yes  Perineum: perineum intact  Lochia: mild  Extremities: Normal, no cyanosis, clubbing, 1+ edema extremities, peripheral pulses and reflexes normal, no edema, redness or tenderness in the calves or thighs, Homans sign is negative, no sign of DVT, feet normal, good pulses, normal color, temperature and sensation    Meds:  Current Facility-Administered Medications   Medication Dose   • lactated ringers (LR) infusion     • acetaminophen (TYLENOL) tablet 1,000 mg  1,000 mg   • ketorolac (TORADOL) injection 30 mg  30 mg   • oxyCODONE immediate-release (ROXICODONE) tablet 5 mg  5 mg   • oxyCODONE immediate release (ROXICODONE) tablet 10 mg  10 mg   • HYDROmorphone pf (DILAUDID) injection 0.2 mg  0.2 mg   • HYDROmorphone pf (DILAUDID) injection 0.4 mg  0.4 mg   • ondansetron (ZOFRAN) syringe/vial injection 4 mg  4 mg   • diphenhydrAMINE (BENADRYL) injection 12.5 mg  12.5 mg   • naloxone (NARCAN) 0.4 mg in NS  1,000 mL infusion  0.4 mg   • diphenhydrAMINE (BENADRYL) injection 12.5 mg  12.5 mg    Or   • diphenhydrAMINE (BENADRYL) injection 25 mg  25 mg    Or   • naloxone (NARCAN) 0.4 mg in NS 1,000 mL infusion  0.4 mg   • LR infusion     • PRN oxytocin (PITOCIN) (20 Units/1000 mL) PRN for excessive uterine bleeding - See Admin Instr  125-999 mL/hr   • miSOPROStol (CYTOTEC) tablet 800 mcg  800 mcg   • docusate sodium (COLACE) capsule 100 mg  100 mg   • simethicone (MYLICON) chewable tab 80 mg  80 mg   • measles, mumps and rubella vaccine (MMR) injection 0.5 mL  0.5 mL       Labs:   Recent Labs      19   0800  19   1826   WBC  9.9  16.4*   RBC  4.54  4.31   HEMOGLOBIN  10.4*  10.1*   HEMATOCRIT  34.5*  33.2*   MCV  76.0*  77.0*   MCH  22.9*  23.4*   MCHC  30.1*  30.4*   RDW  47.2  46.9   PLATELETCT  288  245   MPV  10.0  9.9       Assessment:  Chief Admitting Dx:  Pregnancy  HISTORY OF  DELIVERY, 39 +4 WEEKS GESTATION   Labor and delivery, indication for care  Labor and delivery, indication for care  Delivery Type:   for repeat  Tubal Ligation:  no    Plan:  Continue routine post partum care.  Lactation support  Rh positive  Anticipate discharge POD#3    Barbara Llanes C.N.M.

## 2019-06-01 NOTE — CARE PLAN
Problem: Altered physiologic condition related to postoperative  delivery  Goal: Patient physiologically stable as evidenced by normal lochia, palpable uterine involution and vital signs within normal limits  Outcome: PROGRESSING AS EXPECTED  Fundal massage done with light bleeding    Problem: Alteration in comfort related to surgical incision and/or after birth pains  Goal: Patient verbalizes acceptable pain level  Outcome: PROGRESSING AS EXPECTED  Pain controlled

## 2019-06-01 NOTE — CARE PLAN
Problem: Alteration in comfort related to surgical incision and/or after birth pains  Goal: Patient is able to ambulate, care for self and infant with acceptable pain level  Patient able to care for self and baby

## 2019-06-01 NOTE — LACTATION NOTE
This note was copied from a baby's chart.  MOB reports that it is more difficult to latch infant on the left. After being placed skin to skin, infant was latched without difficulty teaching MOB nipple to nose technique. Reinforce previous lactation education. MOB voices understanding.

## 2019-06-01 NOTE — PROGRESS NOTES
2000 Received awake on bed bonding with baby, with robertson catheter connected to urine bag draining to clear urine output, with sequential stocking bilaterally, Assessment done wound covered with Transparent film clean and dry, Pt denies pain at this time, Encourage mobilization, Needs attended.

## 2019-06-02 LAB
RHODAMINE-AURAMINE STN SPEC: NORMAL
SIGNIFICANT IND 70042: NORMAL
SITE SITE: NORMAL
SOURCE SOURCE: NORMAL
T PALLIDUM AB SER QL AGGL: REACTIVE

## 2019-06-02 PROCEDURE — A9270 NON-COVERED ITEM OR SERVICE: HCPCS | Performed by: OBSTETRICS & GYNECOLOGY

## 2019-06-02 PROCEDURE — 770002 HCHG ROOM/CARE - OB PRIVATE (112)

## 2019-06-02 PROCEDURE — 700102 HCHG RX REV CODE 250 W/ 637 OVERRIDE(OP): Performed by: OBSTETRICS & GYNECOLOGY

## 2019-06-02 RX ADMIN — OXYCODONE HYDROCHLORIDE AND ACETAMINOPHEN 1 TABLET: 10; 325 TABLET ORAL at 17:11

## 2019-06-02 RX ADMIN — OXYCODONE HYDROCHLORIDE AND ACETAMINOPHEN 1 TABLET: 10; 325 TABLET ORAL at 06:15

## 2019-06-02 RX ADMIN — IBUPROFEN 800 MG: 800 TABLET ORAL at 11:32

## 2019-06-02 RX ADMIN — IBUPROFEN 800 MG: 800 TABLET ORAL at 02:12

## 2019-06-02 RX ADMIN — OXYCODONE HYDROCHLORIDE AND ACETAMINOPHEN 1 TABLET: 10; 325 TABLET ORAL at 11:32

## 2019-06-02 RX ADMIN — IBUPROFEN 800 MG: 800 TABLET ORAL at 21:26

## 2019-06-02 RX ADMIN — OXYCODONE HYDROCHLORIDE AND ACETAMINOPHEN 1 TABLET: 10; 325 TABLET ORAL at 21:26

## 2019-06-02 RX ADMIN — OXYCODONE HYDROCHLORIDE AND ACETAMINOPHEN 1 TABLET: 10; 325 TABLET ORAL at 02:12

## 2019-06-02 ASSESSMENT — EDINBURGH POSTNATAL DEPRESSION SCALE (EPDS)
I HAVE BEEN ANXIOUS OR WORRIED FOR NO GOOD REASON: HARDLY EVER
THINGS HAVE BEEN GETTING ON TOP OF ME: YES, SOMETIMES I HAVEN'T BEEN COPING AS WELL AS USUAL
I HAVE BEEN ABLE TO LAUGH AND SEE THE FUNNY SIDE OF THINGS: AS MUCH AS I ALWAYS COULD
I HAVE BEEN SO UNHAPPY THAT I HAVE BEEN CRYING: ONLY OCCASIONALLY
I HAVE BEEN SO UNHAPPY THAT I HAVE HAD DIFFICULTY SLEEPING: NOT VERY OFTEN
I HAVE LOOKED FORWARD WITH ENJOYMENT TO THINGS: AS MUCH AS I EVER DID
THE THOUGHT OF HARMING MYSELF HAS OCCURRED TO ME: NEVER
I HAVE FELT SCARED OR PANICKY FOR NO GOOD REASON: NO, NOT MUCH
I HAVE FELT SAD OR MISERABLE: NOT VERY OFTEN
I HAVE BLAMED MYSELF UNNECESSARILY WHEN THINGS WENT WRONG: NOT VERY OFTEN

## 2019-06-02 NOTE — LACTATION NOTE
This note was copied from a baby's chart.  Follow-up visit, Baby 39.4 weeks, weight loss 7.95%. Mother reports started supplementing with formula this morning due to weight loss. Mother states, baby is now mostly bottle feeding, baby not interested in breastfeeding. Baby asleep in mother's arms, mother reports baby bottle fed 1 hour ago. Discussed with mother if she needs help with latch call for nurse or LC for assistance. Supplemental guideline gold sheet given with review, mother voiced understanding on volumes to feed every 2-3 hours.

## 2019-06-02 NOTE — PROGRESS NOTES
Post Partum Progress Note    Name:   Rhonda Lopez   Date/Time:  2019 - 6:53 AM  Chief Admitting Dx:  Pregnancy  HISTORY OF  DELIVERY, 39 +4 WEEKS GESTATION   Labor and delivery, indication for care  Labor and delivery, indication for care  Delivery Type:   for repeat  Post-Op/Post Partum Days #:  2    Subjective:  Abdominal pain: no  Ambulating:   yes  Tolerating liquids:  yes  Tolerating food:  yes common adult  Flatus:   yes  BM:    no  Bleeding:   without any bleeding  Voiding:   yes  Dizziness:   no  Feeding:   both breast and bottle - Similac with iron    Vitals:    19 0559 19 1000 19 1800 19 0600   BP: 104/56 106/49 (!) 97/59 107/68   Pulse: 74 68 72 66   Resp: 18 18 18 18   Temp: 36.6 °C (97.9 °F) 36.3 °C (97.4 °F) 36.2 °C (97.2 °F) 36.2 °C (97.1 °F)   TempSrc: Oral Temporal Temporal Temporal   SpO2: 96% 98% 97% 96%   Weight:       Height:           Exam:  Breast: Tenderness no  Abdomen: Abdomen soft, non-tender. BS normal. No masses,  No organomegaly  Fundal Tenderness:  yes  Fundus Firm: yes  Incision: dry and intact  Below umbilicus: yes  Perineum: perineum intact  Lochia: mild  Extremities: Normal extremities, peripheral pulses and reflexes normal    Meds:  Current Facility-Administered Medications   Medication Dose   • acetaminophen (TYLENOL) tablet 650 mg  650 mg   • oxyCODONE-acetaminophen (PERCOCET) 5-325 MG per tablet 1 Tab  1 Tab   • oxyCODONE-acetaminophen (PERCOCET-10)  MG per tablet 1 Tab  1 Tab   • morphine (pf) 4 mg/ml injection 4 mg  4 mg   • ondansetron (ZOFRAN) syringe/vial injection 4 mg  4 mg    Or   • ondansetron (ZOFRAN ODT) dispertab 4 mg  4 mg   • diphenhydrAMINE (BENADRYL) tablet/capsule 25 mg  25 mg    Or   • diphenhydrAMINE (BENADRYL) injection 25 mg  25 mg   • ibuprofen (MOTRIN) tablet 800 mg  800 mg   • lactated ringers (LR) infusion     • LR infusion     • PRN oxytocin (PITOCIN) (20 Units/1000 mL) PRN for excessive  uterine bleeding - See Admin Instr  125-999 mL/hr   • miSOPROStol (CYTOTEC) tablet 800 mcg  800 mcg   • docusate sodium (COLACE) capsule 100 mg  100 mg   • simethicone (MYLICON) chewable tab 80 mg  80 mg       Labs:   Recent Labs      19   0800  19   1826   WBC  9.9  16.4*   RBC  4.54  4.31   HEMOGLOBIN  10.4*  10.1*   HEMATOCRIT  34.5*  33.2*   MCV  76.0*  77.0*   MCH  22.9*  23.4*   MCHC  30.1*  30.4*   RDW  47.2  46.9   PLATELETCT  288  245   MPV  10.0  9.9       Assessment:  Chief Admitting Dx:  Pregnancy  HISTORY OF  DELIVERY, 39 +4 WEEKS GESTATION   Labor and delivery, indication for care  Labor and delivery, indication for care  Delivery Type:   for repeat  Tubal Ligation:  no    Plan:  Continue routine post partum care.  Encourage breast feeding  Anticipate discharge on POD#3    LUZ Younger.

## 2019-06-02 NOTE — CARE PLAN
Problem: Potential for postpartum infection related to surgical incision, compromised uterine condition, urinary tract or respiratory compromise  Goal: Patient will be afebrile and free from signs and symptoms of infection  Outcome: PROGRESSING AS EXPECTED  Encourage more ambulation    Problem: Alteration in comfort related to surgical incision and/or after birth pains  Goal: Patient is able to ambulate, care for self and infant with acceptable pain level  Outcome: PROGRESSING AS EXPECTED  Pain medicine given as requested

## 2019-06-02 NOTE — PROGRESS NOTES
1930 Pt doing well bonding with baby, Assessment done wound covered with Tegaderm clean and dry, Pt denies pain at this time, Encourage more ambulation, Needs attended.

## 2019-06-02 NOTE — PROGRESS NOTES
Report received from night RN. This RN in to see patient. Mother asleep with infant in bed with her. RN woke mother and explained why she should not be co sleeping with the infant. Mother verbalizes understanding. Infant placed in crib by RN.

## 2019-06-03 VITALS
BODY MASS INDEX: 39.53 KG/M2 | RESPIRATION RATE: 17 BRPM | TEMPERATURE: 97.5 F | SYSTOLIC BLOOD PRESSURE: 129 MMHG | WEIGHT: 246 LBS | OXYGEN SATURATION: 93 % | DIASTOLIC BLOOD PRESSURE: 79 MMHG | HEIGHT: 66 IN | HEART RATE: 79 BPM

## 2019-06-03 LAB — PATHOLOGY CONSULT NOTE: NORMAL

## 2019-06-03 PROCEDURE — 700112 HCHG RX REV CODE 229: Performed by: OBSTETRICS & GYNECOLOGY

## 2019-06-03 PROCEDURE — A9270 NON-COVERED ITEM OR SERVICE: HCPCS | Performed by: OBSTETRICS & GYNECOLOGY

## 2019-06-03 PROCEDURE — 700102 HCHG RX REV CODE 250 W/ 637 OVERRIDE(OP): Performed by: OBSTETRICS & GYNECOLOGY

## 2019-06-03 RX ORDER — OXYCODONE HYDROCHLORIDE AND ACETAMINOPHEN 5; 325 MG/1; MG/1
1 TABLET ORAL EVERY 4 HOURS PRN
Qty: 15 TAB | Refills: 0 | Status: SHIPPED | OUTPATIENT
Start: 2019-06-03 | End: 2019-06-10

## 2019-06-03 RX ORDER — PSEUDOEPHEDRINE HCL 30 MG
100 TABLET ORAL 2 TIMES DAILY PRN
Qty: 60 CAP | Refills: 1 | Status: SHIPPED | OUTPATIENT
Start: 2019-06-03

## 2019-06-03 RX ORDER — IBUPROFEN 800 MG/1
800 TABLET ORAL EVERY 8 HOURS PRN
Qty: 30 TAB | Refills: 1 | Status: ON HOLD | OUTPATIENT
Start: 2019-06-03 | End: 2021-08-31

## 2019-06-03 RX ADMIN — DOCUSATE SODIUM 100 MG: 100 CAPSULE, LIQUID FILLED ORAL at 05:41

## 2019-06-03 RX ADMIN — OXYCODONE HYDROCHLORIDE AND ACETAMINOPHEN 1 TABLET: 10; 325 TABLET ORAL at 14:31

## 2019-06-03 RX ADMIN — IBUPROFEN 800 MG: 800 TABLET ORAL at 14:31

## 2019-06-03 RX ADMIN — OXYCODONE HYDROCHLORIDE AND ACETAMINOPHEN 1 TABLET: 10; 325 TABLET ORAL at 09:58

## 2019-06-03 RX ADMIN — OXYCODONE HYDROCHLORIDE AND ACETAMINOPHEN 1 TABLET: 10; 325 TABLET ORAL at 05:41

## 2019-06-03 RX ADMIN — OXYCODONE HYDROCHLORIDE AND ACETAMINOPHEN 1 TABLET: 10; 325 TABLET ORAL at 01:28

## 2019-06-03 RX ADMIN — IBUPROFEN 800 MG: 800 TABLET ORAL at 05:41

## 2019-06-03 ASSESSMENT — EDINBURGH POSTNATAL DEPRESSION SCALE (EPDS)
I HAVE FELT SAD OR MISERABLE: NOT VERY OFTEN
THE THOUGHT OF HARMING MYSELF HAS OCCURRED TO ME: NEVER
I HAVE BEEN ABLE TO LAUGH AND SEE THE FUNNY SIDE OF THINGS: AS MUCH AS I ALWAYS COULD
I HAVE BEEN SO UNHAPPY THAT I HAVE HAD DIFFICULTY SLEEPING: NOT AT ALL
I HAVE FELT SCARED OR PANICKY FOR NO GOOD REASON: NO, NOT AT ALL
I HAVE BEEN SO UNHAPPY THAT I HAVE BEEN CRYING: NO, NEVER
THINGS HAVE BEEN GETTING ON TOP OF ME: NO, MOST OF THE TIME I HAVE COPED QUITE WELL
I HAVE BLAMED MYSELF UNNECESSARILY WHEN THINGS WENT WRONG: NOT VERY OFTEN
I HAVE BEEN ANXIOUS OR WORRIED FOR NO GOOD REASON: HARDLY EVER
I HAVE LOOKED FORWARD WITH ENJOYMENT TO THINGS: AS MUCH AS I EVER DID

## 2019-06-03 NOTE — LACTATION NOTE
"This note was copied from a baby's chart.  Term baby, being supplemented with formula and mother's pumped milk according to the goldenrod guide. Mom to be disharged today. She's pumping about 20 mls total per pumping and feeding it to baby. She has [facility] and has an appt on  6/17 with [facility] on [address] but may need a pump sooner. Asked her to call that WIC today to see if she can get in sooner because she may need a pump.    Helped mother get baby latched in cross-cradle hold onto her right breast. She says that she wants to breastfeed but when I looked on her I&O board she has only put baby to breast once in the last 5 feedings and is feeding formula and her pumped milk at the other feeds. Baby only stays on briefly and mother gives up quickly to bottle feed. She states she's sore, has a compression stripe on the left breast, the right is slightly abraded. Baby tends to tongue suck even on the third day. Tried doing some suck training with him and he did latch with a wider latch at the next attempt.    My impression is that Mom may not carry on with breastfeeding at home. She has a hand pump that she's used to at home, can try to get a loaner pump from [facility] if baby won't latch. We went over the goldenrod guide for amounts to supplement for the first week. We discussed drug use and breastfeeding and how baby gets some of everything she puts into her body. She agreed with that statement. Also went over paced feeding since baby \"guzzles\" at the bottle and he started to choke with this feeding because he wasn't pausing between suck bursts to swallow. She will have [facility] as a good lactation resource. She also has written internet lactation resources.  "

## 2019-06-03 NOTE — PROGRESS NOTES
Assessment complete. VSS- Fundus firm, lochia scant. Patient ambulating and voiding without difficulty. POC discussed at bedside. Feeding plan discussed; infant is bottle and breast feeding. Attempted to help infant to latch, but infant fussy. Encouraged skin to skin. Pt reports feeling full and states infant has not latched today and has only bottle fed. Spoke with pt about her plan at home and pt states she would like to breast and bottle feed infant. Electric breast pump brought in and frequency/settings reviewed. Settings: cpm 80-60%, suction 10-20% to comfort, for 15 minutes. Pt will call for next feed. Educated pt on safe sleeping practices. Infant in bassinet with large blankets over him, pt verbalizes understanding. Patient would like pain medications scheduled throughout the night. Call light within reach.

## 2019-06-03 NOTE — CARE PLAN
Problem: Alteration in comfort related to surgical incision and/or after birth pains  Goal: Patient is able to ambulate, care for self and infant with acceptable pain level  Patient ambulating well. Patient able to care for self and baby on own.

## 2019-06-03 NOTE — DISCHARGE PLANNING
:    Spoke with RN who stated MOB's sister is here and is stating MOB and baby can stay with her.  TRESA met with FELIPA and her sister-Rafia Lopez (: 11/3/87) who lives at [address] [place] [address] [address] [place] [address].  Phone number is [phone].  FELIPA lives with her boyfriend-Law Blas in a one bedroom apartment.  Rafia stated that MOB and infant can stay with her until she is able to get into her own place.  Rafia stated she would help her sister with getting baby supplies.  Provided Rafia with a children and family resource list and a diaper bank referral.  TRESA will provided FELIPA with a bag of  baby supplies.      TRESA notified De Forbes with [facility] who stated they will not be opening a case on MOB now that she has a place to live.      Plan:  Infant is cleared to discharge home with MOB.

## 2019-06-03 NOTE — DISCHARGE PLANNING
:     Notified by RN that MOB was asking to speak with SW again.  Met with MOB who stated she and the Dad were evicted from their place yesterday and she doesn't have anywhere to go.  MOB stated [place] and cannot stay there.  Asked if there was anyone else she could stay with and she said no.  She stated if she had a place to stay for a couple weeks, then the FOB would get paid and they could rent another room.  Explained to MOB that the only resource I could provide is a list of shelters.  Discussed the concern about the safety of baby and lack of supplies.  MOB stated the FOB would be able to take the baby [place]  MOB also stated the FOB works 16 hours days.  MOB and infant are both medically ready for discharge today.  Discussed with MOB that a report will be made to CPS due to homelessness and lack of supplies.   will provide MOB with a list of shelters, food resources, and general resource lists.  Called [facility] and reported concerns to De Forbes.  De stated he will forward the report to his Supervisor and will likely have a worker coming to the hospital to meet with MOB.  Updated RN.     Plan:  Waiting for [facility] to assess.

## 2019-06-03 NOTE — DISCHARGE SUMMARY
Discharge Summary:      Rhonda Lopez      Admit Date:   2019  Discharge Date:  6/3/2019     Admitting diagnosis:  Pregnancy  HISTORY OF  DELIVERY, 39 +4 WEEKS GESTATION   Labor and delivery, indication for care  Labor and delivery, indication for care  Discharge Diagnosis: Status post  for repeat.  Pregnancy Complications see below problem list  Tubal Ligation:  no        History:  No past medical history on file.  OB History    Para Term  AB Living   3 2 2   1 2   SAB TAB Ectopic Molar Multiple Live Births   1       0 2      # Outcome Date GA Lbr Aubrey/2nd Weight Sex Delivery Anes PTL Lv   3 Term 19 39w4d  3.52 kg (7 lb 12.2 oz) M CS-LTranv Spinal N DELANEY   2 Term 08 40w0d  2.92 kg (6 lb 7 oz) F CS-LTranv Spinal N DELANEY      Birth Comments: C/Section due to unable to progress   1 SAB 10/27/07 4w0d    SAB         Birth Comments: PAssed on its own           Patient has no known allergies.  Patient Active Problem List    Diagnosis Date Noted   • Born by  section 2019   • Obesity 2019   • Syphilis affecting pregnancy 2019   • Carrier of group B Streptococcus 2018   • Rubella equivocal  2018   • Mild intermittent asthma without complication 2018   • History of drug use 2018   • History of  delivery 2018   • Supervision of high risk pregnancy in third trimester 2018        Hospital Course:   28 y.o. , now para 2, was admitted with the above mentioned diagnosis, underwent Repeat  repeat,  for repeat. Patient postpartum course was unremarkable, with progressive advancement in diet , ambulation and toleration of oral analgesia. Patient without complaints today and desires discharge.      Vitals:    19 1000 19 1800 19 0600 19 1800   BP: 106/49 (!) 97/59 107/68 132/73   Pulse: 68 72 66 93   Resp: 18 18 18 18   Temp: 36.3 °C (97.4 °F) 36.2 °C (97.2 °F) 36.2  °C (97.1 °F) 36.3 °C (97.4 °F)   TempSrc: Temporal Temporal Temporal Temporal   SpO2: 98% 97% 96% 98%   Weight:       Height:           Current Facility-Administered Medications   Medication Dose   • acetaminophen (TYLENOL) tablet 650 mg  650 mg   • oxyCODONE-acetaminophen (PERCOCET) 5-325 MG per tablet 1 Tab  1 Tab   • oxyCODONE-acetaminophen (PERCOCET-10)  MG per tablet 1 Tab  1 Tab   • morphine (pf) 4 mg/ml injection 4 mg  4 mg   • ondansetron (ZOFRAN) syringe/vial injection 4 mg  4 mg    Or   • ondansetron (ZOFRAN ODT) dispertab 4 mg  4 mg   • diphenhydrAMINE (BENADRYL) tablet/capsule 25 mg  25 mg    Or   • diphenhydrAMINE (BENADRYL) injection 25 mg  25 mg   • ibuprofen (MOTRIN) tablet 800 mg  800 mg   • lactated ringers (LR) infusion     • LR infusion     • PRN oxytocin (PITOCIN) (20 Units/1000 mL) PRN for excessive uterine bleeding - See Admin Instr  125-999 mL/hr   • miSOPROStol (CYTOTEC) tablet 800 mcg  800 mcg   • docusate sodium (COLACE) capsule 100 mg  100 mg   • simethicone (MYLICON) chewable tab 80 mg  80 mg       Exam:  Breast Exam: negative  Abdomen: Abdomen soft, non-tender. BS normal. No masses,  No organomegaly  Fundus Non Tender: yes  Incision: dry and intact  Perineum: perineum intact  Extremity: extremities, peripheral pulses and reflexes normal     Labs:  Recent Labs      05/31/19   0800  05/31/19   1826   WBC  9.9  16.4*   RBC  4.54  4.31   HEMOGLOBIN  10.4*  10.1*   HEMATOCRIT  34.5*  33.2*   MCV  76.0*  77.0*   MCH  22.9*  23.4*   MCHC  30.1*  30.4*   RDW  47.2  46.9   PLATELETCT  288  245   MPV  10.0  9.9        Activity:   Discharge to home  Pelvic Rest x 6 weeks    Assessment:  normal postpartum course  Discharge Assessment: No areas of skin breakdown/redness; surgical incision intact/healing     Follow up: [facility] or [facility] in 5 weeks for vaginal ; 1 week for incision check.      Discharge Meds:   Current Outpatient Prescriptions   Medication Sig Dispense Refill   •  oxyCODONE-acetaminophen (PERCOCET) 5-325 MG Tab Take 1 Tab by mouth every four hours as needed (for Moderate Pain (Pain Scale 4-6) after delivery) for up to 7 days. 15 Tab 0   • ibuprofen (MOTRIN) 800 MG Tab Take 1 Tab by mouth every 8 hours as needed (For cramping after delivery; do not give if patient is receiving ketorolac (Toradol)). 30 Tab 1   • docusate sodium 100 MG Cap Take 100 mg by mouth 2 times a day as needed for Constipation. 60 Cap 1     Patient has not yet decided on contraception.     ANDRAE ManP.

## 2019-06-03 NOTE — DISCHARGE INSTRUCTIONS
POSTPARTUM DISCHARGE INSTRUCTIONS FOR MOM    YOB: 1990   Age: 28 y.o.               Admit Date: 2019     Discharge Date: 6/3/2019  Attending Doctor:  Adam Ortiz M.D.                  Allergies:  Patient has no known allergies.    Discharged to home by car. Discharged via wheelchair, hospital escort: Yes.  Special equipment needed: Not Applicable  Belongings with: Personal  Be sure to schedule a follow-up appointment with your primary care doctor or any specialists as instructed.     Discharge Plan:   Influenza Vaccine Indication: Patient Refuses    REASONS TO CALL YOUR OBSTETRICIAN:  1.   Persistent fever or shaking chills (Temperature higher than 100.4)  2.   Heavy bleeding (soaking more than 1 pad per hour); Passing clots  3.   Foul odor from vagina  4.   Mastitis (Breast infection; breast pain, chills, fever, redness)  5.   Urinary pain, burning or frequency  6.   Episiotomy infection  7.   Abdominal incision infection  8.   Severe depression longer than 24 hours    HAND WASHING  · Prior to handling the baby.  · Before breastfeeding or bottle feeding baby.  · After using the bathroom or changing the baby's diaper.    WOUND CARE  Ask your physician for additional care instructions.  In general:    ·  Incision:      · Keep clean and dry.    · Do NOT lift anything heavier than your baby for up to 6 weeks.    · There should not be any opening or pus.      VAGINAL CARE  · Nothing inside vagina for 6 weeks: no sexual intercourse, tampons or douching.  · Bleeding may continue for 2-4 weeks.  Amount may vary.    · Call your physician for heavy bleeding which means soaking more than 1 pad per hour    BIRTH CONTROL  · It is possible to become pregnant at any time after delivery and while breastfeeding.  · Plan to discuss a method of birth control with your physician at your follow up visit. visit.    DIET AND ELIMINATION  · Eating more fiber (bran cereal, fruits, and vegetables) and drinking  "plenty of fluids will help to avoid constipation.  · Urinary frequency after childbirth is normal.    POSTPARTUM BLUES  During the first few days after birth, you may experience a sense of the \"blues\" which may include impatience, irritability or even crying.  These feeling come and go quickly.  However, as many as 1 in 10 women experience emotional symptoms known as postpartum depression.    Postpartum depression:  May start as early as the second or third day after delivery or take several weeks or months to develop.  Symptoms of \"blues\" are present, but are more intense:  Crying spells; loss of appetite; feelings of hopelessness or loss of control; fear of touching the baby; over concern or no concern at all about the baby; little or no concern about your own appearance/caring for yourself; and/or inability to sleep or excessive sleeping.  Contact your physician if you are experiencing any of these symptoms.    Crisis Hotline:  · [place] Crisis Hotline:  1-118-JGRYZEE  Or 1-484.977.4967  · [place] Crisis Hotline:  1-452.724.7228  Or 625-160-5914    PREVENTING SHAKEN BABY:  If you are angry or stressed, PUT THE BABY IN THE CRIB, step away, take some deep breaths, and wait until you are calm to care for the baby.  DO NOT SHAKE THE BABY.  You are not alone, call a supporter for help.    · Crisis Call Center 24/7 crisis line 537-651-4314 or 1-381.475.8066  · You can also text them, text \"ANSWER\" to 426458    QUIT SMOKING/TOBACCO USE:  I understand the use of any tobacco products increases my chance of suffering from future heart disease and could cause other illnesses which may shorten my life. Quitting the use of tobacco products is the single most important thing I can do to improve my health. For further information on smoking / tobacco cessation call a Toll Free Quit Line at 1-118.717.3943 (*[employer] [place]) or 1-749.224.9139 (American Lung Association) or you can access the web based program at " www.lungusa.org.    · [place] Tobacco Users Help Line:  (479) 678-2756       Toll Free: 1-915.421.8961  · Quit Tobacco Program [facility] (396)973-7876    DEPRESSION / SUICIDE RISK:  As you are discharged from this [facility], it is important to learn how to keep safe from harming yourself.    Recognize the warning signs:  · Abrupt changes in personality, positive or negative- including increase in energy   · Giving away possessions  · Change in eating patterns- significant weight changes-  positive or negative  · Change in sleeping patterns- unable to sleep or sleeping all the time   · Unwillingness or inability to communicate  · Depression  · Unusual sadness, discouragement and loneliness  · Talk of wanting to die  · Neglect of personal appearance   · Rebelliousness- reckless behavior  · Withdrawal from people/activities they love  · Confusion- inability to concentrate     If you or a loved one observes any of these behaviors or has concerns about self-harm, here's what you can do:  · Talk about it- your feelings and reasons for harming yourself  · Remove any means that you might use to hurt yourself (examples: pills, rope, extension cords, firearm)  · Get professional help from the community (Mental Health, Substance Abuse, psychological counseling)  · Do not be alone:Call your Safe Contact- someone whom you trust who will be there for you.  · Call your local CRISIS HOTLINE 751-0351 or 316-176-6276  · Call your local Children's Mobile Crisis Response Team [place] (059) 323-3798 or www.LegalSherpa  · Call the toll free National Suicide Prevention Hotlines   · National Suicide Prevention Lifeline 599-157-XTZH (8333)  · National Hope Line Network 800-SUICIDE (813-0103)    DISCHARGE SURVEY:  Thank you for choosing [facility].  We hope we provided you with very good care.  You may be receiving a survey in the mail.  Please fill it out.  Your opinion is valuable to  us.    ADDITIONAL EDUCATIONAL MATERIALS GIVEN TO PATIENT:        My signature on this form indicates that:  1.  I have reviewed and understand the above information  2.  My questions regarding this information have been answered to my satisfaction.  3.  I have formulated a plan with my discharge nurse to obtain my prescribed medication for home.

## 2019-06-10 ENCOUNTER — POST PARTUM (OUTPATIENT)
Dept: OBGYN | Facility: CLINIC | Age: 29
End: 2019-06-10
Payer: MEDICAID

## 2019-06-10 VITALS — WEIGHT: 235 LBS | BODY MASS INDEX: 37.93 KG/M2 | DIASTOLIC BLOOD PRESSURE: 80 MMHG | SYSTOLIC BLOOD PRESSURE: 122 MMHG

## 2019-06-10 DIAGNOSIS — Z48.89 POSTOPERATIVE VISIT: ICD-10-CM

## 2019-06-10 PROCEDURE — 99024 POSTOP FOLLOW-UP VISIT: CPT | Performed by: OBSTETRICS & GYNECOLOGY

## 2019-06-10 NOTE — PROGRESS NOTES
Chief complaint: Postop visit    SUBJECTIVE:  Rhonda Lopez presents to the clinic to weeks following repeat low transverse .    Dictated by positive RPR status post treatment    Eating a regular diet without difficulty.   Bowel movement are Normal.    The patient is not having any pain.   Spotting.   Patient Denies Incisional pain, drainage or redness    OBJECTIVE:  /80   Wt 106.6 kg (235 lb)   LMP 2018 (Exact Date)   BMI 37.93 kg/m²   Current Outpatient Prescriptions on File Prior to Visit   Medication Sig Dispense Refill   • oxyCODONE-acetaminophen (PERCOCET) 5-325 MG Tab Take 1 Tab by mouth every four hours as needed (for Moderate Pain (Pain Scale 4-6) after delivery) for up to 7 days. 15 Tab 0   • ibuprofen (MOTRIN) 800 MG Tab Take 1 Tab by mouth every 8 hours as needed (For cramping after delivery; do not give if patient is receiving ketorolac (Toradol)). 30 Tab 1   • docusate sodium 100 MG Cap Take 100 mg by mouth 2 times a day as needed for Constipation. 60 Cap 1   • NS SOLN 60 mL with albuterol 2.5 mg/0.5 mL NEBU 5 mL 5 mg/hr by Nebulization route.     • Prenatal Vit-Fe Fumarate-FA (PRENATAL VITAMINS) 28-0.8 MG Tab Take 1 Tab by mouth every day. 90 Tab 3   • acetaminophen (TYLENOL) 325 MG Tab Take 2 Tabs by mouth every four hours as needed. (Patient not taking: Reported on 2019) 60 Tab 0   • cyclobenzaprine (FLEXERIL) 10 MG TABS Take 1 Tab by mouth 3 times a day as needed for Mild Pain. (Patient not taking: Reported on 2018) 30 Tab 0   • ibuprofen (MOTRIN) 600 MG TABS Take 1 Tab by mouth every 6 hours as needed (as needed for pain). (Patient not taking: Reported on 2018) 24 Tab 1     No current facility-administered medications on file prior to visit.        Constitutional:  alert, healthy, no distress.  Abdomen:  soft, bowel sounds active, non-tender, non-distended.  Incision:  healing well, no drainage, no erythema, no hernia, no seroma, no swelling, no  dehiscence, incision well approximated.    IMPRESSION: Doing well postoperatively.  Pt is to increase activities as tolerated.    Lab:   Recent Results (from the past 1008 hour(s))   CBC WITH DIFFERENTIAL    Collection Time: 05/31/19  8:00 AM   Result Value Ref Range    WBC 9.9 4.8 - 10.8 K/uL    RBC 4.54 4.20 - 5.40 M/uL    Hemoglobin 10.4 (L) 12.0 - 16.0 g/dL    Hematocrit 34.5 (L) 37.0 - 47.0 %    MCV 76.0 (L) 81.4 - 97.8 fL    MCH 22.9 (L) 27.0 - 33.0 pg    MCHC 30.1 (L) 33.6 - 35.0 g/dL    RDW 47.2 35.9 - 50.0 fL    Platelet Count 288 164 - 446 K/uL    MPV 10.0 9.0 - 12.9 fL    Neutrophils-Polys 70.80 44.00 - 72.00 %    Lymphocytes 20.90 (L) 22.00 - 41.00 %    Monocytes 5.10 0.00 - 13.40 %    Eosinophils 1.40 0.00 - 6.90 %    Basophils 1.00 0.00 - 1.80 %    Immature Granulocytes 0.80 0.00 - 0.90 %    Nucleated RBC 0.00 /100 WBC    Neutrophils (Absolute) 6.97 2.00 - 7.15 K/uL    Lymphs (Absolute) 2.06 1.00 - 4.80 K/uL    Monos (Absolute) 0.50 0.00 - 0.85 K/uL    Eos (Absolute) 0.14 0.00 - 0.51 K/uL    Baso (Absolute) 0.10 0.00 - 0.12 K/uL    Immature Granulocytes (abs) 0.08 0.00 - 0.11 K/uL    NRBC (Absolute) 0.00 K/uL   HOLD BLOOD BANK SPECIMEN (NOT TESTED)    Collection Time: 05/31/19  8:00 AM   Result Value Ref Range    Holding Tube - Bb DONE    Urine Drug Screen    Collection Time: 05/31/19  9:00 AM   Result Value Ref Range    Amphetamines Urine Negative Negative    Barbiturates Negative Negative    Benzodiazepines Negative Negative    Cocaine Metabolite Negative Negative    Methadone Negative Negative    Opiates Negative Negative    Oxycodone Negative Negative    Phencyclidine -Pcp Negative Negative    Propoxyphene Negative Negative    Cannabinoid Metab Negative Negative   T.PALLIDUM AB EIA    Collection Time: 05/31/19  9:16 AM   Result Value Ref Range    Syphilis, Treponemal Qual Reactive (A) Non Reactive   RPR (SYPHILIS)    Collection Time: 05/31/19  9:16 AM   Result Value Ref Range    Rapid Plasma Reagin  -Rpr- Non Reactive Non Reactive   ANTIBODY,TREPONEMA PALLIDUM    Collection Time: 05/31/19  9:16 AM   Result Value Ref Range    Mha-Tp Reactive (A) Non Reactive   AFB Culture    Collection Time: 05/31/19 12:38 PM   Result Value Ref Range    Significant Indicator NEG     Source TISS     Site Placenta     Culture Result Culture in progress.     AFB Smear Results No acid fast bacilli seen.    Acid Fast Stain    Collection Time: 05/31/19 12:38 PM   Result Value Ref Range    Significant Indicator NEG     Source TISS     Site Placenta     AFB Smear Results No acid fast bacilli seen.    CBC without differential    Collection Time: 05/31/19  6:26 PM   Result Value Ref Range    WBC 16.4 (H) 4.8 - 10.8 K/uL    RBC 4.31 4.20 - 5.40 M/uL    Hemoglobin 10.1 (L) 12.0 - 16.0 g/dL    Hematocrit 33.2 (L) 37.0 - 47.0 %    MCV 77.0 (L) 81.4 - 97.8 fL    MCH 23.4 (L) 27.0 - 33.0 pg    MCHC 30.4 (L) 33.6 - 35.0 g/dL    RDW 46.9 35.9 - 50.0 fL    Platelet Count 245 164 - 446 K/uL    MPV 9.9 9.0 - 12.9 fL   Histology Request    Collection Time: 06/03/19 10:27 AM   Result Value Ref Range    Pathology Request Sent to Histo        PLAN:  Continue any current medications.  (See Med List for details.)  Return to clinic: in 4 week(s).    Patient unsure if she would like any contraception    Discussed with patient a pregnancy may occur if no contraception is used.    All questions answered

## 2019-06-10 NOTE — PROGRESS NOTES
Pt. here for C/S check delivered on 5/31/19 Dr. Ortiz  Currently :breast and bottle feeding  Pt. States no complaints  Post partum visit on not scheduled  Chaperone offered provided

## 2019-07-26 LAB
MYCOBACTERIUM SPEC CULT: NORMAL
RHODAMINE-AURAMINE STN SPEC: NORMAL
SIGNIFICANT IND 70042: NORMAL
SITE SITE: NORMAL
SOURCE SOURCE: NORMAL

## 2020-10-18 ENCOUNTER — HOSPITAL ENCOUNTER (EMERGENCY)
Dept: HOSPITAL 8 - ED | Age: 30
End: 2020-10-18
Payer: MEDICAID

## 2020-10-18 VITALS — HEIGHT: 66 IN | WEIGHT: 228.18 LBS | BODY MASS INDEX: 36.67 KG/M2

## 2020-10-18 VITALS — DIASTOLIC BLOOD PRESSURE: 76 MMHG | SYSTOLIC BLOOD PRESSURE: 121 MMHG

## 2020-10-18 DIAGNOSIS — Z20.828: Primary | ICD-10-CM

## 2020-10-18 DIAGNOSIS — F17.200: ICD-10-CM

## 2020-10-18 PROCEDURE — 87635 SARS-COV-2 COVID-19 AMP PRB: CPT

## 2020-10-18 PROCEDURE — 36415 COLL VENOUS BLD VENIPUNCTURE: CPT

## 2020-10-18 PROCEDURE — 99283 EMERGENCY DEPT VISIT LOW MDM: CPT

## 2020-10-18 NOTE — NUR
dPatient/Caregiver given discharge instructions and they have confirmed that 
they understand the instructions.  Patient ambulatory with steady gait.

## 2021-02-23 LAB
ABO GROUP BLD: NORMAL
BLD GP AB SCN SERPL QL: NEGATIVE
HBV SURFACE AG SERPL QL IA: NEGATIVE
HIV 1+2 AB+HIV1 P24 AG SERPL QL IA: NORMAL
RH BLD: POSITIVE
RUBV IGG SERPL IA-ACNC: NORMAL
TREPONEMA PALLIDUM IGG+IGM AB [PRESENCE] IN SERUM OR PLASMA BY IMMUNOASSAY: NORMAL

## 2021-03-12 ENCOUNTER — APPOINTMENT (OUTPATIENT)
Dept: RADIOLOGY | Facility: MEDICAL CENTER | Age: 31
End: 2021-03-12
Attending: EMERGENCY MEDICINE
Payer: MEDICAID

## 2021-03-12 ENCOUNTER — HOSPITAL ENCOUNTER (EMERGENCY)
Facility: MEDICAL CENTER | Age: 31
End: 2021-03-13
Attending: EMERGENCY MEDICINE
Payer: MEDICAID

## 2021-03-12 DIAGNOSIS — O20.0 THREATENED MISCARRIAGE: ICD-10-CM

## 2021-03-12 DIAGNOSIS — N39.0 URINARY TRACT INFECTION WITH HEMATURIA, SITE UNSPECIFIED: ICD-10-CM

## 2021-03-12 DIAGNOSIS — R31.9 URINARY TRACT INFECTION WITH HEMATURIA, SITE UNSPECIFIED: ICD-10-CM

## 2021-03-12 LAB
ALBUMIN SERPL BCP-MCNC: 3.6 G/DL (ref 3.2–4.9)
ALBUMIN/GLOB SERPL: 1.2 G/DL
ALP SERPL-CCNC: 72 U/L (ref 30–99)
ALT SERPL-CCNC: 10 U/L (ref 2–50)
ANION GAP SERPL CALC-SCNC: 11 MMOL/L (ref 7–16)
APPEARANCE UR: ABNORMAL
AST SERPL-CCNC: 15 U/L (ref 12–45)
BACTERIA #/AREA URNS HPF: ABNORMAL /HPF
BACTERIA GENITAL QL WET PREP: NORMAL
BILIRUB SERPL-MCNC: 0.2 MG/DL (ref 0.1–1.5)
BILIRUB UR QL STRIP.AUTO: NEGATIVE
BUN SERPL-MCNC: 6 MG/DL (ref 8–22)
CALCIUM SERPL-MCNC: 9.3 MG/DL (ref 8.5–10.5)
CHLORIDE SERPL-SCNC: 106 MMOL/L (ref 96–112)
CO2 SERPL-SCNC: 22 MMOL/L (ref 20–33)
COLOR UR: ABNORMAL
CREAT SERPL-MCNC: 0.53 MG/DL (ref 0.5–1.4)
EPI CELLS #/AREA URNS HPF: ABNORMAL /HPF
GLOBULIN SER CALC-MCNC: 2.9 G/DL (ref 1.9–3.5)
GLUCOSE SERPL-MCNC: 84 MG/DL (ref 65–99)
GLUCOSE UR STRIP.AUTO-MCNC: NEGATIVE MG/DL
HYALINE CASTS #/AREA URNS LPF: ABNORMAL /LPF
KETONES UR STRIP.AUTO-MCNC: ABNORMAL MG/DL
LEUKOCYTE ESTERASE UR QL STRIP.AUTO: ABNORMAL
MICRO URNS: ABNORMAL
NITRITE UR QL STRIP.AUTO: NEGATIVE
NUMBER OF RH DOSES IND 8505RD: NORMAL
PH UR STRIP.AUTO: 6.5 [PH] (ref 5–8)
POTASSIUM SERPL-SCNC: 3.4 MMOL/L (ref 3.6–5.5)
PROT SERPL-MCNC: 6.5 G/DL (ref 6–8.2)
PROT UR QL STRIP: 100 MG/DL
RBC # URNS HPF: >150 /HPF
RBC UR QL AUTO: ABNORMAL
RH BLD: NORMAL
SIGNIFICANT IND 70042: NORMAL
SITE SITE: NORMAL
SODIUM SERPL-SCNC: 139 MMOL/L (ref 135–145)
SOURCE SOURCE: NORMAL
SP GR UR STRIP.AUTO: 1.02
UROBILINOGEN UR STRIP.AUTO-MCNC: 1 MG/DL
WBC #/AREA URNS HPF: ABNORMAL /HPF

## 2021-03-12 PROCEDURE — 99284 EMERGENCY DEPT VISIT MOD MDM: CPT

## 2021-03-12 PROCEDURE — 87491 CHLMYD TRACH DNA AMP PROBE: CPT

## 2021-03-12 PROCEDURE — 87086 URINE CULTURE/COLONY COUNT: CPT

## 2021-03-12 PROCEDURE — 80053 COMPREHEN METABOLIC PANEL: CPT

## 2021-03-12 PROCEDURE — 36415 COLL VENOUS BLD VENIPUNCTURE: CPT

## 2021-03-12 PROCEDURE — 87186 SC STD MICRODIL/AGAR DIL: CPT

## 2021-03-12 PROCEDURE — 86901 BLOOD TYPING SEROLOGIC RH(D): CPT

## 2021-03-12 PROCEDURE — 87077 CULTURE AEROBIC IDENTIFY: CPT

## 2021-03-12 PROCEDURE — 87591 N.GONORRHOEAE DNA AMP PROB: CPT

## 2021-03-12 PROCEDURE — 85025 COMPLETE CBC W/AUTO DIFF WBC: CPT

## 2021-03-12 PROCEDURE — 81001 URINALYSIS AUTO W/SCOPE: CPT

## 2021-03-12 NOTE — LETTER
3/15/2021               hRonda Lopez  [address] [place] [address]        Dear Rhonda (MR#[id number])    This letter is sent in regards to your, recent visit to the [facility] Emergency Department on 3/12/2021.  During the visit, tests were performed to assist the physician in a medical diagnosis.  A review of those tests requires that we notify you of the following:    Your urine culture was POSITIVE for a bacteria called Escherichia coli. The antibiotic prescribed for you (cephalexin) should be active to treat this bacteria. IT IS IMPORTANT THAT YOU CONTINUE TAKING YOUR ANTIBIOTIC UNTIL IT IS FINISHED.      Please feel free to contact me at the number below if you have any questions or concerns. Thank you for your cooperation in the matter.    Sincerely,  ED Culture Follow-Up Staff  Eladio Amaya, PharmD,    [facility] [place] [facility], Emergency Department  [address] [place] [id number]  [phone] (ED Culture Line)  [phone]

## 2021-03-13 VITALS
BODY MASS INDEX: 36.39 KG/M2 | HEART RATE: 85 BPM | WEIGHT: 226.41 LBS | DIASTOLIC BLOOD PRESSURE: 59 MMHG | HEIGHT: 66 IN | SYSTOLIC BLOOD PRESSURE: 101 MMHG | TEMPERATURE: 97.1 F | OXYGEN SATURATION: 96 % | RESPIRATION RATE: 17 BRPM

## 2021-03-13 LAB
BASOPHILS # BLD AUTO: 0.5 % (ref 0–1.8)
BASOPHILS # BLD: 0.04 K/UL (ref 0–0.12)
C TRACH DNA SPEC QL NAA+PROBE: NEGATIVE
EOSINOPHIL # BLD AUTO: 0.12 K/UL (ref 0–0.51)
EOSINOPHIL NFR BLD: 1.5 % (ref 0–6.9)
ERYTHROCYTE [DISTWIDTH] IN BLOOD BY AUTOMATED COUNT: 48.7 FL (ref 35.9–50)
HCT VFR BLD AUTO: 37.6 % (ref 37–47)
HGB BLD-MCNC: 12.3 G/DL (ref 12–16)
IMM GRANULOCYTES # BLD AUTO: 0.03 K/UL (ref 0–0.11)
IMM GRANULOCYTES NFR BLD AUTO: 0.4 % (ref 0–0.9)
LYMPHOCYTES # BLD AUTO: 1.61 K/UL (ref 1–4.8)
LYMPHOCYTES NFR BLD: 19.6 % (ref 22–41)
MCH RBC QN AUTO: 28 PG (ref 27–33)
MCHC RBC AUTO-ENTMCNC: 32.7 G/DL (ref 33.6–35)
MCV RBC AUTO: 85.5 FL (ref 81.4–97.8)
MONOCYTES # BLD AUTO: 0.36 K/UL (ref 0–0.85)
MONOCYTES NFR BLD AUTO: 4.4 % (ref 0–13.4)
N GONORRHOEA DNA SPEC QL NAA+PROBE: NEGATIVE
NEUTROPHILS # BLD AUTO: 6.07 K/UL (ref 2–7.15)
NEUTROPHILS NFR BLD: 73.6 % (ref 44–72)
NRBC # BLD AUTO: 0 K/UL
NRBC BLD-RTO: 0 /100 WBC
PLATELET # BLD AUTO: 201 K/UL (ref 164–446)
PMV BLD AUTO: 10.1 FL (ref 9–12.9)
RBC # BLD AUTO: 4.4 M/UL (ref 4.2–5.4)
SPECIMEN SOURCE: NORMAL
WBC # BLD AUTO: 8.2 K/UL (ref 4.8–10.8)

## 2021-03-13 PROCEDURE — 76815 OB US LIMITED FETUS(S): CPT

## 2021-03-13 PROCEDURE — A9270 NON-COVERED ITEM OR SERVICE: HCPCS | Performed by: EMERGENCY MEDICINE

## 2021-03-13 PROCEDURE — 700102 HCHG RX REV CODE 250 W/ 637 OVERRIDE(OP): Performed by: EMERGENCY MEDICINE

## 2021-03-13 RX ORDER — CEPHALEXIN 500 MG/1
500 CAPSULE ORAL 2 TIMES DAILY
Qty: 10 CAPSULE | Refills: 0 | Status: SHIPPED | OUTPATIENT
Start: 2021-03-13 | End: 2021-03-18

## 2021-03-13 RX ORDER — CEPHALEXIN 500 MG/1
500 CAPSULE ORAL ONCE
Status: COMPLETED | OUTPATIENT
Start: 2021-03-13 | End: 2021-03-13

## 2021-03-13 RX ADMIN — CEPHALEXIN 500 MG: 500 CAPSULE ORAL at 00:48

## 2021-03-13 NOTE — ED NOTES
Pt ambulatory to room 20 from triage with steady gait. Agree with triage note. Denies dysuria, c/o constant pressure to low abd. Informed pt of need for urine sample.

## 2021-03-13 NOTE — ED NOTES
Chaperoned ERP with pelvic exam. Pt ambulatory to BR, urine sample collected, sent to lab with vag swabs. Urine noted to be brown and cloudy.

## 2021-03-13 NOTE — ED PROVIDER NOTES
ED Provider Note    CHIEF COMPLAINT  Vaginal bleeding and abdominal pain    [facility]  Rhonda Lopez is a 30 y.o.  female at approximately 14 weeks gestation who presents to the emergency department for the evaluation of vaginal bleeding and abdominal pain. The patient states that she started having some vaginal spotting just prior to arrival.  She has not had to change her pad yet.  She states that she has had lower abdominal cramping and back pain throughout the day.  She admits to nausea and vomiting and states that she has had this throughout her pregnancy.  She states that she has had some pain with urination but does not describe it as burning.  She denies any hematuria.  She denies any fevers, runny nose, cough, congestion, difficulty breathing.  She denies any diarrhea.    REVIEW OF SYSTEMS  See HPI for further details. All other systems are negative.     PAST MEDICAL HISTORY  None    SOCIAL HISTORY  Social History     Tobacco Use   • Smoking status: Former Smoker     Packs/day: 0.25     Years: 10.00     Pack years: 2.50     Types: Cigarettes   • Smokeless tobacco: Never Used   • Tobacco comment: Trying to quit smoking   Substance and Sexual Activity   • Alcohol use: No   • Drug use: Not Currently     Types: Methamphetamines     Comment: last used methamphetamines 10/30/2018   • Sexual activity: Not Currently     Partners: Male     Comment: Unplanned pregnancy. was not on birth control     SURGICAL HISTORY   has a past surgical history that includes other; primary c section (2008); and repeat c section (2019).    CURRENT MEDICATIONS  Home Medications     Reviewed by María Amaral R.N. (Registered Nurse) on 21 at 2217  Med List Status: Partial   Medication Last Dose Status   acetaminophen (TYLENOL) 325 MG Tab  Active   cyclobenzaprine (FLEXERIL) 10 MG TABS  Active   docusate sodium 100 MG Cap  Active   ibuprofen (MOTRIN) 600 MG TABS  Active   ibuprofen (MOTRIN) 800 MG Tab   "Active   NS SOLN 60 mL with albuterol 2.5 mg/0.5 mL NEBU 5 mL  Active   Prenatal Vit-Fe Fumarate-FA (PRENATAL VITAMINS) 28-0.8 MG Tab  Active              ALLERGIES  No Known Allergies    PHYSICAL EXAM  VITAL SIGNS: /63   Pulse 82   Temp 36.6 °C (97.8 °F) (Temporal)   Resp 18   Ht 1.676 m (5' 6\")   Wt 103 kg (226 lb 6.6 oz)   SpO2 98%   BMI 36.54 kg/m²    Constitutional: Alert and in no apparent distress.  HENT: Normocephalic atraumatic. Bilateral external ears normal. Nose normal. Mucous membranes are moist.  Eyes: Pupils are equal and reactive. Conjunctiva normal. Non-icteric sclera.   Neck: Normal range of motion without tenderness. Supple. No meningeal signs.  Cardiovascular: Regular rate and rhythm. No murmurs, gallops or rubs.  Thorax & Lungs: Breath sounds are clear to auscultation bilaterally. No wheezing, rhonchi or rales.  Abdomen: Soft, nontender and nondistended. No peritoneal signs noted.  Pelvic: Raj - Jennifer, ED RN.  Normal external genitalia.  Cervix is closed.  There is a small amount of white discharge.  No active bleeding is noted.  Skin: Warm and dry. No rashes are noted.  Back: No bony tenderness, No CVA tenderness.   Extremities: 2+ peripheral pulses. Cap refill is less than 2 seconds. No edema, cyanosis, or clubbing.  Musculoskeletal: Good range of motion in all major joints. No tenderness to palpation or major deformities noted.   Neurologic: Alert and oriented ×3. The patient moves all 4 extremities and follows commands.  Psychiatric: Affect is normal. Judgment appears to be intact.    DIAGNOSTIC STUDIES / PROCEDURES    LABS  Results for orders placed or performed during the hospital encounter of 03/12/21   CBC WITH DIFFERENTIAL   Result Value Ref Range    WBC 8.2 4.8 - 10.8 K/uL    RBC 4.40 4.20 - 5.40 M/uL    Hemoglobin 12.3 12.0 - 16.0 g/dL    Hematocrit 37.6 37.0 - 47.0 %    MCV 85.5 81.4 - 97.8 fL    MCH 28.0 27.0 - 33.0 pg    MCHC 32.7 (L) 33.6 - 35.0 g/dL    RDW 48.7 " 35.9 - 50.0 fL    Platelet Count 201 164 - 446 K/uL    MPV 10.1 9.0 - 12.9 fL    Neutrophils-Polys 73.60 (H) 44.00 - 72.00 %    Lymphocytes 19.60 (L) 22.00 - 41.00 %    Monocytes 4.40 0.00 - 13.40 %    Eosinophils 1.50 0.00 - 6.90 %    Basophils 0.50 0.00 - 1.80 %    Immature Granulocytes 0.40 0.00 - 0.90 %    Nucleated RBC 0.00 /100 WBC    Neutrophils (Absolute) 6.07 2.00 - 7.15 K/uL    Lymphs (Absolute) 1.61 1.00 - 4.80 K/uL    Monos (Absolute) 0.36 0.00 - 0.85 K/uL    Eos (Absolute) 0.12 0.00 - 0.51 K/uL    Baso (Absolute) 0.04 0.00 - 0.12 K/uL    Immature Granulocytes (abs) 0.03 0.00 - 0.11 K/uL    NRBC (Absolute) 0.00 K/uL   COMP METABOLIC PANEL   Result Value Ref Range    Sodium 139 135 - 145 mmol/L    Potassium 3.4 (L) 3.6 - 5.5 mmol/L    Chloride 106 96 - 112 mmol/L    Co2 22 20 - 33 mmol/L    Anion Gap 11.0 7.0 - 16.0    Glucose 84 65 - 99 mg/dL    Bun 6 (L) 8 - 22 mg/dL    Creatinine 0.53 0.50 - 1.40 mg/dL    Calcium 9.3 8.5 - 10.5 mg/dL    AST(SGOT) 15 12 - 45 U/L    ALT(SGPT) 10 2 - 50 U/L    Alkaline Phosphatase 72 30 - 99 U/L    Total Bilirubin 0.2 0.1 - 1.5 mg/dL    Albumin 3.6 3.2 - 4.9 g/dL    Total Protein 6.5 6.0 - 8.2 g/dL    Globulin 2.9 1.9 - 3.5 g/dL    A-G Ratio 1.2 g/dL   URINALYSIS CULTURE, IF INDICATED    Specimen: Urine   Result Value Ref Range    Color Princess     Character Turbid (A)     Specific Gravity 1.019 <1.035    Ph 6.5 5.0 - 8.0    Glucose Negative Negative mg/dL    Ketones Trace (A) Negative mg/dL    Protein 100 (A) Negative mg/dL    Bilirubin Negative Negative    Urobilinogen, Urine 1.0 Negative    Nitrite Negative Negative    Leukocyte Esterase Moderate (A) Negative    Occult Blood Large (A) Negative    Micro Urine Req Microscopic    RH TYPE FOR RHOGAM FROM E.D.   Result Value Ref Range    Emergency Department Rh Typing POS     Number Of Rh Doses Indicated ZERO    WET PREP    Specimen: Vaginal; Genital   Result Value Ref Range    Significant Indicator NEG     Source GEN      Site VAGINAL     Wet Prep For Parasites       Few WBCs seen.  No yeast.  No motile Trichomonas seen.  No clue cells seen.     CHLAMYDIA & GC BY PCR    Specimen: Genital; Urine   Result Value Ref Range    Source Urine    URINE MICROSCOPIC (W/UA)   Result Value Ref Range    WBC Packed (A) /hpf    RBC >150 (A) /hpf    Bacteria Few (A) None /hpf    Epithelial Cells Few /hpf    Hyaline Cast 0-2 /lpf   URINE CULTURE(NEW)    Specimen: Urine   Result Value Ref Range    Significant Indicator NEG     Source UR     Site -     Culture Result -    ESTIMATED GFR   Result Value Ref Range    GFR If African American >60 >60 mL/min/1.73 m 2    GFR If Non African American >60 >60 mL/min/1.73 m 2     RADIOLOGY  US-OB LIMITED TRANSABDOMINAL   Final Result      Single intrauterine pregnancy of an estimated gestational age of 15 weeks 4 days with an estimated date of delivery of 2021.      No subchorionic hemorrhage.        COURSE & MEDICAL DECISION MAKING  Pertinent Labs & Imaging studies reviewed. (See chart for details)    This is a  30-year-old female presenting to the emergency department for evaluation of vaginal bleeding and abdominal pain at approximately 14 weeks gestation.  On initial evaluation, the patient did not appear to be in any acute distress.  Abdominal exam was benign.  Pelvic exam revealed a closed cervix and a small amount of white discharge.  No active bleeding was noted.  An IV was established and labs were sent.  White blood cell count was normal and H&H were also within normal limits and reassuring.  Rh was negative and she does not require RhoGam at this time.  A genital pathogen swab was obtained and there is no evidence of bacterial vaginosis or trichomonas.  GC were pending.  Electrolytes, renal function, and LFTs were reassuring.  Urinalysis was obtained and notable for packed WBCs as well as a bacteria.  Patient was treated for UTI and given her first dose of Keflex here.  She had no CVA tenderness  and her vital signs were normal.  I extremely low clinical suspicion for pyelonephritis or sepsis.  Pelvic ultrasound was obtained and a single IUP of approximately 15 weeks and 4 days was noted with fetal heart tones in the 140s.  The patient's clinical presentation is most consistent with a threatened miscarriage.  I encouraged pelvic rest and close follow-up with her OB/GYN.  She will call first in the morning.  She understands return to the ED with any worsening signs or symptoms including worsening bleeding, worsening pain, or syncope.    I verified that the patient was wearing a mask and I was wearing appropriate PPE every time I entered the room. The patient's mask was on the patient at all times during my encounter except for a brief view of the oropharynx.    FINAL IMPRESSION  1. Threatened miscarriage    2. Urinary tract infection with hematuria, site unspecified      PRESCRIPTIONS  New Prescriptions    CEPHALEXIN (KEFLEX) 500 MG CAP    Take 1 capsule by mouth 2 times a day for 5 days.     FOLLOW UP  [facility] [address]  [facility] [id number] [phone]  Call in 1 day  To schedule a follow up appointment    [facility], Emergency Dept  [address] [facility] [place] [id number] [phone]  Go to   As needed    -DISCHARGE-    Electronically signed by: Pearl Ly D.O., 3/12/2021 11:10 PM

## 2021-03-13 NOTE — ED NOTES
Discharge instructions provided with prescription teaching, pt verbalizes understanding. Pt is awake, alert, VSS. Pt ambulatory with steady gait out of ER with friend.

## 2021-03-13 NOTE — ED TRIAGE NOTES
"  Chief Complaint   Patient presents with   • Vaginal Bleeding     pt states she is 14 weeks pregnant and states a few hours ago she started having vaginal bleeding, pain all around lower back and abdomen. states bleeding is only spotting.    • Low Back Pain   • Abdominal Pain       Pt ambulatory to triage. Pt alert and oriented. Pt educated on triage process and to update staff if any changes. Pt placed back into lobby.    /69   Pulse 79   Temp 36.6 °C (97.8 °F) (Temporal)   Resp 18   Ht 1.676 m (5' 6\")   Wt 103 kg (226 lb 6.6 oz)   SpO2 99%   BMI 36.54 kg/m²       "

## 2021-03-13 NOTE — ED NOTES
Pt sitting up in gurney talking to friend at bedside. NAD. Pt comfort level checked, denies needs.

## 2021-03-15 NOTE — ED NOTES
ED Positive Culture Follow-up/Notification Note:    Date: 3/15/2021     Patient seen in the ED on 3/12/2021 for vaginal bleeding, abdominal pain, patient is 14 weeks pregnant .   1. Threatened miscarriage    2. Urinary tract infection with hematuria, site unspecified       Discharge Medication List as of 3/13/2021 12:53 AM      START taking these medications    Details   cephALEXin (KEFLEX) 500 MG Cap Take 1 capsule by mouth 2 times a day for 5 days., Disp-10 capsule, R-0, Print Rx Paper           Keflex 500 mg PO x1 given in ED     Allergies: Patient has no known allergies.     Vitals:    03/12/21 2249 03/12/21 2300 03/13/21 0020 03/13/21 0048   BP: 116/56 110/57 114/63 101/59   Pulse: 88 92 82 85   Resp: 18 20 18 17   Temp:    36.2 °C (97.1 °F)   TempSrc:    Temporal   SpO2: 96% 98% 98% 96%   Weight:       Height:           Final cultures:   Results     Procedure Component Value Units Date/Time    URINE CULTURE(NEW) [[id number]]  (Abnormal)  (Susceptibility) Collected: 03/12/21 2307    Order Status: Completed Specimen: Urine Updated: 03/15/21 0816     Significant Indicator POS     Source UR     Site -     Culture Result -      Escherichia coli  >100,000 cfu/mL      Narrative:      Indication for culture:->Patient WITHOUT an indwelling Renteria  catheter in place with new onset of Dysuria, Frequency,  Urgency, and/or Suprapubic pain    Susceptibility     Escherichia coli (1)     Antibiotic Interpretation Microscan Method Status    Amikacin [*]  Sensitive <=16 mcg/mL HARLAN Final    Ampicillin Resistant >16 mcg/mL HARLAN Final    Amoxicillin/CA [*]  Sensitive <=8/4 mcg/mL HARLAN Final    Aztreonam [*]  Sensitive <=4 mcg/mL HARLAN Final    Ceftolozane+Tazobactam [*]  Sensitive <=2 mcg/mL HARLAN Final    Ceftriaxone Sensitive <=1 mcg/mL HARLAN Final    Ceftazidime Sensitive <=1 mcg/mL HARLAN Final    Cefotaxime Sensitive <=2 mcg/mL HARLAN Final    Cefazolin Sensitive 4 mcg/mL HARLAN Final    Ciprofloxacin Sensitive <=0.25 mcg/mL HARLAN Final     Cefepime Sensitive <=2 mcg/mL HARLAN Final    Cefuroxime Sensitive <=4 mcg/mL HARLAN Final    Ampicillin/sulbactam Resistant >16/8 mcg/mL HARLAN Final    Ceftazidime+Avibactam [*]  Sensitive <=4 mcg/mL HARLAN Final    Tobramycin Sensitive <=2 mcg/mL HARLAN Final    Ertapenem [*]  Sensitive <=0.5 mcg/mL HARLAN Final    Nitrofurantoin Sensitive <=32 mcg/mL HARLAN Final    Gentamicin Sensitive <=2 mcg/mL HARLAN Final    Imipenem [*]  Sensitive <=1 mcg/mL HARLAN Final    Levofloxacin Sensitive <=0.5 mcg/mL HARLAN Final    Meropenem [*]  Sensitive <=1 mcg/mL HARLAN Final    Meropenem/Vaborbactam [*]  Sensitive <=2 mcg/mL HARLAN Final    Pip/Tazobactam Sensitive <=8 mcg/mL HARLAN Final    Trimeth/Sulfa Resistant >2/38 mcg/mL HARLAN Final    Tetracycline [*]  Sensitive <=4 mcg/mL HARLAN Final    Tigecycline Sensitive <=2 mcg/mL HARLAN Final           [*]   Suppressed Antibiotic                 CHLAMYDIA & GC BY PCR [[id number]] Collected: 03/12/21 2307    Order Status: Completed Specimen: Urine from Genital Updated: 03/13/21 1756     C. trachomatis by PCR Negative     N. gonorrhoeae by PCR Negative     Source Urine    Narrative:      Indication for culture:->Patient WITHOUT an indwelling Renteria  catheter in place with new onset of Dysuria, Frequency,  Urgency, and/or Suprapubic pain    WET PREP [[id number]] Collected: 03/12/21 2307    Order Status: Completed Specimen: Genital from Vaginal Updated: 03/12/21 2330     Significant Indicator NEG     Source GEN     Site VAGINAL     Wet Prep For Parasites Few WBCs seen.  No yeast.  No motile Trichomonas seen.  No clue cells seen.      URINALYSIS CULTURE, IF INDICATED [[id number]]  (Abnormal) Collected: 03/12/21 2311    Order Status: Completed Specimen: Urine Updated: 03/12/21 2324     Color Princess     Character Turbid     Specific Gravity 1.019     Ph 6.5     Glucose Negative mg/dL      Ketones Trace mg/dL      Protein 100 mg/dL      Bilirubin Negative     Urobilinogen, Urine 1.0     Nitrite Negative     Leukocyte Esterase Moderate      Occult Blood Large     Micro Urine Req Microscopic    Narrative:      Indication for culture:->Patient WITHOUT an indwelling Renteria  catheter in place with new onset of Dysuria, Frequency,  Urgency, and/or Suprapubic pain          Plan:   Attempted to contact patient to see if she is improving, left message, will send letter.   Appropriate antibiotic therapy prescribed. No changes required based upon culture result.  Sent letter to patient to notify of positive culture result and encourage compliance with prescribed antibiotics.     Eladio Amaya, PharmD

## 2021-08-16 LAB — GP B STREP DNA SPEC QL NAA+PROBE: NEGATIVE

## 2021-08-24 NOTE — H&P
DATE OF ADMISSION:  2021     HISTORY OF PRESENT ILLNESS:  The patient is a 30-year-old,  4, para   1-1-0-2, with prior 2 C-sections, started prenatal care early in the first   trimester, has been uneventful prenatal care, and the patient desires a repeat   , scheduled for surgery on  at 12:00 noon.     MEDICAL HISTORY:  Denies high blood pressure, diabetes or thyroid issues.     OBSTETRIC HISTORY:   4, para 2, one term and 1  delivery at 36   weeks.  Both of them were done by  section.  This pregnancy,   uneventful prenatal care.     SOCIAL HISTORY:  Denies smoking or alcohol use.     PAST SURGICAL HISTORY:  Prior 2 C-sections.     FAMILY HISTORY:  Nothing contributory except for arthritis in mom.     ALLERGIES:  No known drug allergies.     GENERAL REVIEW OF SYSTEMS:  The patient is alert and oriented.  Denies   shortness of breath, chest pain or palpitation.  Regular bowel and bladder   habits.     GENERAL PHYSICAL EXAMINATION:  VITAL SIGNS: Stable.  Blood pressure is 140/74 mmHg.  Denies any eminent   symptoms.  SYSTEMIC EXAMINATION:  The patient is alert and oriented x3.  LUNGS:  Clear to auscultate bilaterally and expand symmetrically.  HEART: S1, S2 normal.  No added sounds.  ABDOMEN:  Term gestation. Cephalic presentation.  Relaxed uterus.  Fetal heart   rate 140 beats per minute.  PELVIC:  Deferred.     IMPRESSION:  A 30-year-old  4, para 2, one term and 1     at 38 and 36 weeks, today is 38 weeks and 3 days of gestational age,   scheduled for a repeat  at 39 weeks.  Preoperative instructions were   given.  Operative procedure discussed in detail.  Postoperative recovery   explained.  The patient understands.  All questions answered to satisfaction.    Risks inclusive of but not limited to infection, injury and bleeding were   discussed.  Talked about sterilization, the patient is not sure. We will   discuss further about  contraception during the postpartum visit.        ______________________________  MD JUAN C Noe/GARRICK/ELVI    DD:  08/24/2021 13:41  DT:  08/24/2021 14:03    Job#:  [id number]

## 2021-08-25 ENCOUNTER — PRE-ADMISSION TESTING (OUTPATIENT)
Dept: ADMISSIONS | Facility: MEDICAL CENTER | Age: 31
End: 2021-08-25
Attending: OBSTETRICS & GYNECOLOGY
Payer: MEDICAID

## 2021-08-25 RX ORDER — ARIPIPRAZOLE 10 MG/1
10 TABLET ORAL DAILY
COMMUNITY

## 2021-08-25 RX ORDER — BUPROPION HYDROCHLORIDE 100 MG/1
100 TABLET ORAL 2 TIMES DAILY
COMMUNITY

## 2021-08-25 RX ORDER — ALBUTEROL SULFATE 90 UG/1
2 AEROSOL, METERED RESPIRATORY (INHALATION) EVERY 6 HOURS PRN
COMMUNITY

## 2021-08-29 ENCOUNTER — HOSPITAL ENCOUNTER (EMERGENCY)
Facility: MEDICAL CENTER | Age: 31
End: 2021-08-29
Attending: OBSTETRICS & GYNECOLOGY | Admitting: OBSTETRICS & GYNECOLOGY
Payer: MEDICAID

## 2021-08-29 VITALS
DIASTOLIC BLOOD PRESSURE: 75 MMHG | HEART RATE: 80 BPM | HEIGHT: 66 IN | SYSTOLIC BLOOD PRESSURE: 120 MMHG | RESPIRATION RATE: 16 BRPM | WEIGHT: 246 LBS | BODY MASS INDEX: 39.53 KG/M2 | TEMPERATURE: 98 F

## 2021-08-29 PROCEDURE — 302449 STATCHG TRIAGE ONLY (STATISTIC)

## 2021-08-29 PROCEDURE — 59025 FETAL NON-STRESS TEST: CPT

## 2021-08-29 ASSESSMENT — PAIN SCALES - GENERAL: PAINLEVEL: 4

## 2021-08-29 ASSESSMENT — FIBROSIS 4 INDEX: FIB4 SCORE: 0.76

## 2021-08-29 NOTE — PROGRESS NOTES
30 y.o.  EDC  (39.1 wks)     Scheduled repeat C/S in 2 days    Pt presents to L&D c/o irregular contractions. Denies LOF/VB. Reports +FM. SVE fingertip.     Dr. Edgar notified. D/c order received. D/c instructions reviewed with pt. Pt requesting to be checked prior to d/c. SVE=unchanged. D/c'd in stable condition.

## 2021-08-30 ENCOUNTER — ANESTHESIA EVENT (OUTPATIENT)
Dept: OBGYN | Facility: MEDICAL CENTER | Age: 31
End: 2021-08-30
Payer: MEDICAID

## 2021-08-31 ENCOUNTER — HOSPITAL ENCOUNTER (INPATIENT)
Facility: MEDICAL CENTER | Age: 31
LOS: 4 days | End: 2021-09-04
Attending: OBSTETRICS & GYNECOLOGY | Admitting: OBSTETRICS & GYNECOLOGY
Payer: MEDICAID

## 2021-08-31 ENCOUNTER — ANESTHESIA (OUTPATIENT)
Dept: OBGYN | Facility: MEDICAL CENTER | Age: 31
End: 2021-08-31
Payer: MEDICAID

## 2021-08-31 DIAGNOSIS — R10.2 PELVIC PAIN: ICD-10-CM

## 2021-08-31 LAB
BASOPHILS # BLD AUTO: 0.4 % (ref 0–1.8)
BASOPHILS # BLD: 0.04 K/UL (ref 0–0.12)
EOSINOPHIL # BLD AUTO: 0.04 K/UL (ref 0–0.51)
EOSINOPHIL NFR BLD: 0.4 % (ref 0–6.9)
ERYTHROCYTE [DISTWIDTH] IN BLOOD BY AUTOMATED COUNT: 43.5 FL (ref 35.9–50)
HCT VFR BLD AUTO: 38.2 % (ref 37–47)
HGB BLD-MCNC: 12.6 G/DL (ref 12–16)
HOLDING TUBE BB 8507: NORMAL
IMM GRANULOCYTES # BLD AUTO: 0.05 K/UL (ref 0–0.11)
IMM GRANULOCYTES NFR BLD AUTO: 0.5 % (ref 0–0.9)
LYMPHOCYTES # BLD AUTO: 1.93 K/UL (ref 1–4.8)
LYMPHOCYTES NFR BLD: 20.3 % (ref 22–41)
MCH RBC QN AUTO: 27.9 PG (ref 27–33)
MCHC RBC AUTO-ENTMCNC: 33 G/DL (ref 33.6–35)
MCV RBC AUTO: 84.5 FL (ref 81.4–97.8)
MONOCYTES # BLD AUTO: 0.44 K/UL (ref 0–0.85)
MONOCYTES NFR BLD AUTO: 4.6 % (ref 0–13.4)
NEUTROPHILS # BLD AUTO: 7 K/UL (ref 2–7.15)
NEUTROPHILS NFR BLD: 73.8 % (ref 44–72)
NRBC # BLD AUTO: 0 K/UL
NRBC BLD-RTO: 0 /100 WBC
PLATELET # BLD AUTO: 240 K/UL (ref 164–446)
PMV BLD AUTO: 9.9 FL (ref 9–12.9)
RBC # BLD AUTO: 4.52 M/UL (ref 4.2–5.4)
SARS-COV+SARS-COV-2 AG RESP QL IA.RAPID: NOTDETECTED
SPECIMEN SOURCE: NORMAL
WBC # BLD AUTO: 9.5 K/UL (ref 4.8–10.8)

## 2021-08-31 PROCEDURE — 160009 HCHG ANES TIME/MIN: Performed by: OBSTETRICS & GYNECOLOGY

## 2021-08-31 PROCEDURE — 160002 HCHG RECOVERY MINUTES (STAT): Performed by: OBSTETRICS & GYNECOLOGY

## 2021-08-31 PROCEDURE — 87426 SARSCOV CORONAVIRUS AG IA: CPT

## 2021-08-31 PROCEDURE — 36415 COLL VENOUS BLD VENIPUNCTURE: CPT

## 2021-08-31 PROCEDURE — 160029 HCHG SURGERY MINUTES - 1ST 30 MINS LEVEL 4: Performed by: OBSTETRICS & GYNECOLOGY

## 2021-08-31 PROCEDURE — 700105 HCHG RX REV CODE 258: Performed by: ANESTHESIOLOGY

## 2021-08-31 PROCEDURE — 700101 HCHG RX REV CODE 250: Performed by: ANESTHESIOLOGY

## 2021-08-31 PROCEDURE — 700102 HCHG RX REV CODE 250 W/ 637 OVERRIDE(OP): Performed by: ANESTHESIOLOGY

## 2021-08-31 PROCEDURE — A9270 NON-COVERED ITEM OR SERVICE: HCPCS | Performed by: ANESTHESIOLOGY

## 2021-08-31 PROCEDURE — 160048 HCHG OR STATISTICAL LEVEL 1-5: Performed by: OBSTETRICS & GYNECOLOGY

## 2021-08-31 PROCEDURE — 160041 HCHG SURGERY MINUTES - EA ADDL 1 MIN LEVEL 4: Performed by: OBSTETRICS & GYNECOLOGY

## 2021-08-31 PROCEDURE — 160035 HCHG PACU - 1ST 60 MINS PHASE I: Performed by: OBSTETRICS & GYNECOLOGY

## 2021-08-31 PROCEDURE — 700111 HCHG RX REV CODE 636 W/ 250 OVERRIDE (IP): Performed by: OBSTETRICS & GYNECOLOGY

## 2021-08-31 PROCEDURE — 700111 HCHG RX REV CODE 636 W/ 250 OVERRIDE (IP): Performed by: ANESTHESIOLOGY

## 2021-08-31 PROCEDURE — 770002 HCHG ROOM/CARE - OB PRIVATE (112)

## 2021-08-31 PROCEDURE — 85025 COMPLETE CBC W/AUTO DIFF WBC: CPT

## 2021-08-31 RX ORDER — HYDROMORPHONE HYDROCHLORIDE 1 MG/ML
0.2 INJECTION, SOLUTION INTRAMUSCULAR; INTRAVENOUS; SUBCUTANEOUS
Status: DISCONTINUED | OUTPATIENT
Start: 2021-08-31 | End: 2021-08-31 | Stop reason: HOSPADM

## 2021-08-31 RX ORDER — HYDROMORPHONE HYDROCHLORIDE 1 MG/ML
0.2 INJECTION, SOLUTION INTRAMUSCULAR; INTRAVENOUS; SUBCUTANEOUS
Status: DISCONTINUED | OUTPATIENT
Start: 2021-08-31 | End: 2021-09-01

## 2021-08-31 RX ORDER — OXYCODONE HYDROCHLORIDE 5 MG/1
10 TABLET ORAL EVERY 4 HOURS PRN
Status: DISCONTINUED | OUTPATIENT
Start: 2021-08-31 | End: 2021-09-01

## 2021-08-31 RX ORDER — MISOPROSTOL 200 UG/1
600 TABLET ORAL
Status: DISCONTINUED | OUTPATIENT
Start: 2021-08-31 | End: 2021-09-04 | Stop reason: HOSPADM

## 2021-08-31 RX ORDER — VITAMIN A ACETATE, BETA CAROTENE, ASCORBIC ACID, CHOLECALCIFEROL, .ALPHA.-TOCOPHEROL ACETATE, DL-, THIAMINE MONONITRATE, RIBOFLAVIN, NIACINAMIDE, PYRIDOXINE HYDROCHLORIDE, FOLIC ACID, CYANOCOBALAMIN, CALCIUM CARBONATE, FERROUS FUMARATE, ZINC OXIDE, CUPRIC OXIDE 3080; 12; 120; 400; 1; 1.84; 3; 20; 22; 920; 25; 200; 27; 10; 2 [IU]/1; UG/1; MG/1; [IU]/1; MG/1; MG/1; MG/1; MG/1; MG/1; [IU]/1; MG/1; MG/1; MG/1; MG/1; MG/1
1 TABLET, FILM COATED ORAL
Status: DISCONTINUED | OUTPATIENT
Start: 2021-08-31 | End: 2021-09-04 | Stop reason: HOSPADM

## 2021-08-31 RX ORDER — SODIUM CHLORIDE, SODIUM LACTATE, POTASSIUM CHLORIDE, CALCIUM CHLORIDE 600; 310; 30; 20 MG/100ML; MG/100ML; MG/100ML; MG/100ML
INJECTION, SOLUTION INTRAVENOUS PRN
Status: DISCONTINUED | OUTPATIENT
Start: 2021-08-31 | End: 2021-09-04 | Stop reason: HOSPADM

## 2021-08-31 RX ORDER — HYDROMORPHONE HYDROCHLORIDE 1 MG/ML
0.4 INJECTION, SOLUTION INTRAMUSCULAR; INTRAVENOUS; SUBCUTANEOUS
Status: DISCONTINUED | OUTPATIENT
Start: 2021-08-31 | End: 2021-09-01

## 2021-08-31 RX ORDER — CEFAZOLIN SODIUM 1 G/3ML
INJECTION, POWDER, FOR SOLUTION INTRAMUSCULAR; INTRAVENOUS PRN
Status: DISCONTINUED | OUTPATIENT
Start: 2021-08-31 | End: 2021-08-31 | Stop reason: SURG

## 2021-08-31 RX ORDER — OXYTOCIN 10 [USP'U]/ML
INJECTION, SOLUTION INTRAMUSCULAR; INTRAVENOUS PRN
Status: DISCONTINUED | OUTPATIENT
Start: 2021-08-31 | End: 2021-08-31 | Stop reason: SURG

## 2021-08-31 RX ORDER — HYDROMORPHONE HYDROCHLORIDE 1 MG/ML
0.4 INJECTION, SOLUTION INTRAMUSCULAR; INTRAVENOUS; SUBCUTANEOUS
Status: DISCONTINUED | OUTPATIENT
Start: 2021-08-31 | End: 2021-08-31 | Stop reason: HOSPADM

## 2021-08-31 RX ORDER — KETOROLAC TROMETHAMINE 30 MG/ML
INJECTION, SOLUTION INTRAMUSCULAR; INTRAVENOUS PRN
Status: DISCONTINUED | OUTPATIENT
Start: 2021-08-31 | End: 2021-08-31 | Stop reason: SURG

## 2021-08-31 RX ORDER — DIPHENHYDRAMINE HYDROCHLORIDE 50 MG/ML
12.5 INJECTION INTRAMUSCULAR; INTRAVENOUS EVERY 6 HOURS PRN
Status: DISCONTINUED | OUTPATIENT
Start: 2021-08-31 | End: 2021-09-01

## 2021-08-31 RX ORDER — ACETAMINOPHEN 500 MG
1000 TABLET ORAL EVERY 6 HOURS
Status: COMPLETED | OUTPATIENT
Start: 2021-08-31 | End: 2021-09-01

## 2021-08-31 RX ORDER — ONDANSETRON 2 MG/ML
INJECTION INTRAMUSCULAR; INTRAVENOUS PRN
Status: DISCONTINUED | OUTPATIENT
Start: 2021-08-31 | End: 2021-08-31 | Stop reason: SURG

## 2021-08-31 RX ORDER — DEXAMETHASONE SODIUM PHOSPHATE 4 MG/ML
INJECTION, SOLUTION INTRA-ARTICULAR; INTRALESIONAL; INTRAMUSCULAR; INTRAVENOUS; SOFT TISSUE PRN
Status: DISCONTINUED | OUTPATIENT
Start: 2021-08-31 | End: 2021-08-31 | Stop reason: SURG

## 2021-08-31 RX ORDER — PHENYLEPHRINE HCL IN 0.9% NACL 0.5 MG/5ML
SYRINGE (ML) INTRAVENOUS PRN
Status: DISCONTINUED | OUTPATIENT
Start: 2021-08-31 | End: 2021-08-31 | Stop reason: SURG

## 2021-08-31 RX ORDER — SODIUM CHLORIDE, SODIUM LACTATE, POTASSIUM CHLORIDE, CALCIUM CHLORIDE 600; 310; 30; 20 MG/100ML; MG/100ML; MG/100ML; MG/100ML
INJECTION, SOLUTION INTRAVENOUS CONTINUOUS
Status: DISCONTINUED | OUTPATIENT
Start: 2021-08-31 | End: 2021-08-31

## 2021-08-31 RX ORDER — KETOROLAC TROMETHAMINE 30 MG/ML
30 INJECTION, SOLUTION INTRAMUSCULAR; INTRAVENOUS EVERY 6 HOURS
Status: DISCONTINUED | OUTPATIENT
Start: 2021-08-31 | End: 2021-08-31 | Stop reason: HOSPADM

## 2021-08-31 RX ORDER — SODIUM CHLORIDE, SODIUM LACTATE, POTASSIUM CHLORIDE, CALCIUM CHLORIDE 600; 310; 30; 20 MG/100ML; MG/100ML; MG/100ML; MG/100ML
INJECTION, SOLUTION INTRAVENOUS CONTINUOUS
Status: DISCONTINUED | OUTPATIENT
Start: 2021-08-31 | End: 2021-09-04 | Stop reason: HOSPADM

## 2021-08-31 RX ORDER — HYDROMORPHONE HYDROCHLORIDE 1 MG/ML
0.1 INJECTION, SOLUTION INTRAMUSCULAR; INTRAVENOUS; SUBCUTANEOUS
Status: DISCONTINUED | OUTPATIENT
Start: 2021-08-31 | End: 2021-08-31 | Stop reason: HOSPADM

## 2021-08-31 RX ORDER — SODIUM CHLORIDE, SODIUM GLUCONATE, SODIUM ACETATE, POTASSIUM CHLORIDE AND MAGNESIUM CHLORIDE 526; 502; 368; 37; 30 MG/100ML; MG/100ML; MG/100ML; MG/100ML; MG/100ML
500 INJECTION, SOLUTION INTRAVENOUS CONTINUOUS
Status: DISCONTINUED | OUTPATIENT
Start: 2021-08-31 | End: 2021-08-31 | Stop reason: HOSPADM

## 2021-08-31 RX ORDER — OXYCODONE HYDROCHLORIDE 5 MG/1
5 TABLET ORAL EVERY 4 HOURS PRN
Status: DISCONTINUED | OUTPATIENT
Start: 2021-08-31 | End: 2021-09-01

## 2021-08-31 RX ORDER — ONDANSETRON 2 MG/ML
4 INJECTION INTRAMUSCULAR; INTRAVENOUS
Status: DISCONTINUED | OUTPATIENT
Start: 2021-08-31 | End: 2021-08-31 | Stop reason: HOSPADM

## 2021-08-31 RX ORDER — METOCLOPRAMIDE HYDROCHLORIDE 5 MG/ML
10 INJECTION INTRAMUSCULAR; INTRAVENOUS ONCE
Status: COMPLETED | OUTPATIENT
Start: 2021-08-31 | End: 2021-08-31

## 2021-08-31 RX ORDER — CITRIC ACID/SODIUM CITRATE 334-500MG
30 SOLUTION, ORAL ORAL ONCE
Status: COMPLETED | OUTPATIENT
Start: 2021-08-31 | End: 2021-08-31

## 2021-08-31 RX ORDER — DOCUSATE SODIUM 100 MG/1
100 CAPSULE, LIQUID FILLED ORAL 2 TIMES DAILY PRN
Status: DISCONTINUED | OUTPATIENT
Start: 2021-08-31 | End: 2021-09-04 | Stop reason: HOSPADM

## 2021-08-31 RX ORDER — SODIUM CHLORIDE, SODIUM GLUCONATE, SODIUM ACETATE, POTASSIUM CHLORIDE AND MAGNESIUM CHLORIDE 526; 502; 368; 37; 30 MG/100ML; MG/100ML; MG/100ML; MG/100ML; MG/100ML
1500 INJECTION, SOLUTION INTRAVENOUS ONCE
Status: COMPLETED | OUTPATIENT
Start: 2021-08-31 | End: 2021-08-31

## 2021-08-31 RX ORDER — MORPHINE SULFATE 0.5 MG/ML
INJECTION, SOLUTION EPIDURAL; INTRATHECAL; INTRAVENOUS
Status: COMPLETED | OUTPATIENT
Start: 2021-08-31 | End: 2021-08-31

## 2021-08-31 RX ORDER — OXYCODONE HCL 5 MG/5 ML
10 SOLUTION, ORAL ORAL
Status: DISCONTINUED | OUTPATIENT
Start: 2021-08-31 | End: 2021-08-31 | Stop reason: HOSPADM

## 2021-08-31 RX ORDER — SIMETHICONE 80 MG
80 TABLET,CHEWABLE ORAL 3 TIMES DAILY PRN
Status: DISCONTINUED | OUTPATIENT
Start: 2021-08-31 | End: 2021-09-04 | Stop reason: HOSPADM

## 2021-08-31 RX ORDER — HALOPERIDOL 5 MG/ML
1 INJECTION INTRAMUSCULAR
Status: DISCONTINUED | OUTPATIENT
Start: 2021-08-31 | End: 2021-08-31 | Stop reason: HOSPADM

## 2021-08-31 RX ORDER — ONDANSETRON 2 MG/ML
4 INJECTION INTRAMUSCULAR; INTRAVENOUS EVERY 6 HOURS PRN
Status: DISCONTINUED | OUTPATIENT
Start: 2021-08-31 | End: 2021-09-01

## 2021-08-31 RX ORDER — DIPHENHYDRAMINE HYDROCHLORIDE 50 MG/ML
25 INJECTION INTRAMUSCULAR; INTRAVENOUS EVERY 6 HOURS PRN
Status: DISCONTINUED | OUTPATIENT
Start: 2021-08-31 | End: 2021-09-01

## 2021-08-31 RX ORDER — BUPIVACAINE HYDROCHLORIDE 7.5 MG/ML
INJECTION, SOLUTION INTRASPINAL
Status: COMPLETED | OUTPATIENT
Start: 2021-08-31 | End: 2021-08-31

## 2021-08-31 RX ORDER — MEPERIDINE HYDROCHLORIDE 25 MG/ML
6.25 INJECTION INTRAMUSCULAR; INTRAVENOUS; SUBCUTANEOUS
Status: DISCONTINUED | OUTPATIENT
Start: 2021-08-31 | End: 2021-08-31 | Stop reason: HOSPADM

## 2021-08-31 RX ORDER — SODIUM CHLORIDE, SODIUM GLUCONATE, SODIUM ACETATE, POTASSIUM CHLORIDE AND MAGNESIUM CHLORIDE 526; 502; 368; 37; 30 MG/100ML; MG/100ML; MG/100ML; MG/100ML; MG/100ML
INJECTION, SOLUTION INTRAVENOUS
Status: DISCONTINUED | OUTPATIENT
Start: 2021-08-31 | End: 2021-08-31 | Stop reason: SURG

## 2021-08-31 RX ORDER — OXYCODONE HCL 5 MG/5 ML
5 SOLUTION, ORAL ORAL
Status: DISCONTINUED | OUTPATIENT
Start: 2021-08-31 | End: 2021-08-31 | Stop reason: HOSPADM

## 2021-08-31 RX ADMIN — Medication 100 MCG: at 13:51

## 2021-08-31 RX ADMIN — DEXAMETHASONE SODIUM PHOSPHATE 8 MG: 4 INJECTION, SOLUTION INTRA-ARTICULAR; INTRALESIONAL; INTRAMUSCULAR; INTRAVENOUS; SOFT TISSUE at 14:11

## 2021-08-31 RX ADMIN — KETOROLAC TROMETHAMINE 30 MG: 30 INJECTION, SOLUTION INTRAMUSCULAR at 14:27

## 2021-08-31 RX ADMIN — ACETAMINOPHEN 1000 MG: 500 TABLET ORAL at 22:47

## 2021-08-31 RX ADMIN — METOCLOPRAMIDE 10 MG: 5 INJECTION, SOLUTION INTRAMUSCULAR; INTRAVENOUS at 13:22

## 2021-08-31 RX ADMIN — ONDANSETRON 4 MG: 2 INJECTION INTRAMUSCULAR; INTRAVENOUS at 22:49

## 2021-08-31 RX ADMIN — ACETAMINOPHEN 1000 MG: 500 TABLET ORAL at 16:36

## 2021-08-31 RX ADMIN — FAMOTIDINE 20 MG: 10 INJECTION INTRAVENOUS at 13:22

## 2021-08-31 RX ADMIN — CEFAZOLIN 2 G: 330 INJECTION, POWDER, FOR SOLUTION INTRAMUSCULAR; INTRAVENOUS at 13:46

## 2021-08-31 RX ADMIN — Medication 125 ML/HR: at 15:20

## 2021-08-31 RX ADMIN — BUPIVACAINE HYDROCHLORIDE IN DEXTROSE 1.5 ML: 7.5 INJECTION, SOLUTION SUBARACHNOID at 13:41

## 2021-08-31 RX ADMIN — SODIUM CITRATE AND CITRIC ACID MONOHYDRATE 30 ML: 500; 334 SOLUTION ORAL at 13:21

## 2021-08-31 RX ADMIN — OXYCODONE 5 MG: 5 TABLET ORAL at 20:01

## 2021-08-31 RX ADMIN — SODIUM CHLORIDE, SODIUM GLUCONATE, SODIUM ACETATE, POTASSIUM CHLORIDE AND MAGNESIUM CHLORIDE 1500 ML: 526; 502; 368; 37; 30 INJECTION, SOLUTION INTRAVENOUS at 13:22

## 2021-08-31 RX ADMIN — FENTANYL CITRATE 10 MCG: 50 INJECTION, SOLUTION INTRAMUSCULAR; INTRAVENOUS at 13:41

## 2021-08-31 RX ADMIN — SODIUM CHLORIDE, SODIUM GLUCONATE, SODIUM ACETATE, POTASSIUM CHLORIDE AND MAGNESIUM CHLORIDE: 526; 502; 368; 37; 30 INJECTION, SOLUTION INTRAVENOUS at 13:56

## 2021-08-31 RX ADMIN — SODIUM CHLORIDE, SODIUM GLUCONATE, SODIUM ACETATE, POTASSIUM CHLORIDE AND MAGNESIUM CHLORIDE: 526; 502; 368; 37; 30 INJECTION, SOLUTION INTRAVENOUS at 13:37

## 2021-08-31 RX ADMIN — PHENYLEPHRINE HYDROCHLORIDE 30 MCG/MIN: 10 INJECTION INTRAVENOUS at 13:40

## 2021-08-31 RX ADMIN — MORPHINE SULFATE 150 MCG: 0.5 INJECTION, SOLUTION EPIDURAL; INTRATHECAL; INTRAVENOUS at 13:41

## 2021-08-31 RX ADMIN — OXYTOCIN 20 UNITS: 10 INJECTION, SOLUTION INTRAMUSCULAR; INTRAVENOUS at 14:10

## 2021-08-31 RX ADMIN — ONDANSETRON 4 MG: 2 INJECTION INTRAMUSCULAR; INTRAVENOUS at 13:52

## 2021-08-31 ASSESSMENT — LIFESTYLE VARIABLES
TOTAL SCORE: 0
ON A TYPICAL DAY WHEN YOU DRINK ALCOHOL HOW MANY DRINKS DO YOU HAVE: 0
EVER_SMOKED: YES
HOW MANY TIMES IN THE PAST YEAR HAVE YOU HAD 5 OR MORE DRINKS IN A DAY: 0
EVER HAD A DRINK FIRST THING IN THE MORNING TO STEADY YOUR NERVES TO GET RID OF A HANGOVER: NO
CONSUMPTION TOTAL: NEGATIVE
EVER FELT BAD OR GUILTY ABOUT YOUR DRINKING: NO
TOTAL SCORE: 0
HAVE PEOPLE ANNOYED YOU BY CRITICIZING YOUR DRINKING: NO
AVERAGE NUMBER OF DAYS PER WEEK YOU HAVE A DRINK CONTAINING ALCOHOL: 0
HAVE YOU EVER FELT YOU SHOULD CUT DOWN ON YOUR DRINKING: NO
ALCOHOL_USE: NO
TOTAL SCORE: 0

## 2021-08-31 ASSESSMENT — COPD QUESTIONNAIRES
HAVE YOU SMOKED AT LEAST 100 CIGARETTES IN YOUR ENTIRE LIFE: NO/DON'T KNOW
IN THE PAST 12 MONTHS DO YOU DO LESS THAN YOU USED TO BECAUSE OF YOUR BREATHING PROBLEMS: DISAGREE/UNSURE
DURING THE PAST 4 WEEKS HOW MUCH DID YOU FEEL SHORT OF BREATH: NONE/LITTLE OF THE TIME
COPD SCREENING SCORE: 0
DO YOU EVER COUGH UP ANY MUCUS OR PHLEGM?: NO/ONLY WITH OCCASIONAL COLDS OR INFECTIONS

## 2021-08-31 ASSESSMENT — FIBROSIS 4 INDEX: FIB4 SCORE: 0.76

## 2021-08-31 ASSESSMENT — PAIN DESCRIPTION - PAIN TYPE
TYPE: SURGICAL PAIN
TYPE: SURGICAL PAIN

## 2021-08-31 ASSESSMENT — PAIN SCALES - GENERAL: PAIN_LEVEL: 0

## 2021-08-31 ASSESSMENT — PATIENT HEALTH QUESTIONNAIRE - PHQ9
2. FEELING DOWN, DEPRESSED, IRRITABLE, OR HOPELESS: NOT AT ALL
1. LITTLE INTEREST OR PLEASURE IN DOING THINGS: NOT AT ALL
SUM OF ALL RESPONSES TO PHQ9 QUESTIONS 1 AND 2: 0

## 2021-08-31 NOTE — OR SURGEON
Immediate Post OP Note    PreOp Diagnosis: term   prior c/sx2         PostOp Diagnosis: same       Procedure(s):   SECTION, REPEAT - Wound Class: Clean Contaminated    Surgeon(s):  SHOLA Abdi M.D.    Anesthesiologist/Type of Anesthesia:  Anesthesiologist: Park Rios M.D./Spinal    Surgical Staff:  Circulator: Prak Garcia R.N.  Relief Scrub: Bee Cespedes  Scrub Person: Gisella SCOTT&ANU Circulator Assistant: Jeanette Rico R.N.  L&ANU Baby  Nurse: Kassy Newell R.N.    Specimens removed if any:  * No specimens in log *    Estimated Blood Loss: 600 ml.    Findings: normal uterus both tubes and ovaries normal   dense adhesions of rectus fascia to underlying muscle and adhesions of bladder to lower uterine segment.    Complications: none.      2021 2:38 PM Bonnie Ghotra M.D.

## 2021-08-31 NOTE — ANESTHESIA TIME REPORT
Anesthesia Start and Stop Event Times     Date Time Event    2021 1110 Ready for Procedure     1337 Anesthesia Start     1440 Anesthesia Stop        Responsible Staff  21    Name Role Begin End    Park Rios M.D. Anesth 1337 1440        Preop Diagnosis (Free Text):  Pre-op Diagnosis     FULL TERM PREGNANCY, REPEAT   39+3 WEEKS        Preop Diagnosis (Codes):  Diagnosis Information     Diagnosis Code(s):  delivery delivered [O82]        Post op Diagnosis  History of       Premium Reason  Non-Premium    Comments:

## 2021-08-31 NOTE — ANESTHESIA PROCEDURE NOTES
Spinal Block    Date/Time: 8/31/2021 1:41 PM  Performed by: Park Rios M.D.  Authorized by: Park Rios M.D.     Start Time:  8/31/2021 1:41 PM  End Time:  8/31/2021 1:43 PM  Reason for Block: primary anesthetic    patient identified, IV checked, site marked, risks and benefits discussed, surgical consent, monitors and equipment checked, pre-op evaluation and timeout performed    Patient Position:  Sitting  Prep: ChloraPrep, patient draped and sterile technique    Monitoring:  Blood pressure, continuous pulse oximetry and heart rate  Approach:  Midline  Location:  L3-4  Injection Technique:  Single-shot  Skin infiltration:  Lidocaine  Strength:  1%  Dose:  3ml  Needle Type:  Pencan  Needle Gauge:  25 G  CSF flowing pre/post injection:  Yes  Sensory Level:  T4   Single pass, uneventful placement.  Pt tolerated procedure well.

## 2021-08-31 NOTE — PROGRESS NOTES
1017  39.3 patient reports to labor for scheduled repeat  section; history, allergies, and pregnancy reviewed, pt placed on external monitors, labs sent    1409 Platter delivery of male infant, 8/9 APGARS    1550 Inspirometer explained and patient return demonstrated proper technique, robert care complete, patient transferred to postpartum via gurney, bedside report given to Silvia HOWARD

## 2021-08-31 NOTE — ANESTHESIA POSTPROCEDURE EVALUATION
Patient: Rhonda Lopez    Procedure Summary     Date: 21 Room / Location: [facility] OR 02 / SURGERY LABOR AND DELIVERY    Anesthesia Start: 1337 Anesthesia Stop:     Procedure:  SECTION, REPEAT Diagnosis:        delivery delivered      (FULL TERM PREGNANCY, REPEAT )      (39+3 WEEKS)    Surgeons: Bonnie Ghotra M.D. Responsible Provider: Park Rios M.D.    Anesthesia Type: spinal ASA Status: 2          Final Anesthesia Type: spinal  Last vitals  BP   118/63   Temp   96.4   Pulse   80   Resp   16    SpO2   95%     Anesthesia Post Evaluation    Patient location during evaluation: PACU  Patient participation: complete - patient participated  Level of consciousness: awake and alert  Pain score: 0    Airway patency: patent  Anesthetic complications: no  Cardiovascular status: hemodynamically stable  Respiratory status: acceptable  Hydration status: euvolemic    PONV: none    patient able to participate, but full recovery from regional anesthesia has not occurred and is not expected within the stipulated timeframe for the completion of the evaluation      No complications documented.     Nurse Pain Score: 3 (NPRS)

## 2021-08-31 NOTE — ANESTHESIA PREPROCEDURE EVALUATION
29 yo  banuelos IUP at 38-3 here for repeat .  No drug abuse for 2.5 years. No previous GA.  NPO.     Relevant Problems   PULMONARY   (positive) Mild intermittent asthma without complication      NEURO   (positive) History of drug use      OB   (positive) History of  delivery      Other   (positive) Obesity   (positive) Syphilis affecting pregnancy       Physical Exam    Airway   Mallampati: II  TM distance: >3 FB  Neck ROM: full       Cardiovascular - normal exam  Rhythm: regular  Rate: normal  (-) murmur     Dental - normal exam           Pulmonary - normal exam  Breath sounds clear to auscultation     Abdominal   (+) obese     Neurological - normal exam                 Anesthesia Plan    ASA 2       Plan - spinal   Neuraxial block will be primary anesthetic                Postoperative Plan: Postoperative administration of opioids is intended.    Pertinent diagnostic labs and testing reviewed    Informed Consent:    Anesthetic plan and risks discussed with patient.    Use of blood products discussed with: patient whom consented to blood products.

## 2021-09-01 LAB
ERYTHROCYTE [DISTWIDTH] IN BLOOD BY AUTOMATED COUNT: 43.8 FL (ref 35.9–50)
HCT VFR BLD AUTO: 33.5 % (ref 37–47)
HGB BLD-MCNC: 10.8 G/DL (ref 12–16)
MCH RBC QN AUTO: 27.8 PG (ref 27–33)
MCHC RBC AUTO-ENTMCNC: 32.2 G/DL (ref 33.6–35)
MCV RBC AUTO: 86.3 FL (ref 81.4–97.8)
PLATELET # BLD AUTO: 229 K/UL (ref 164–446)
PMV BLD AUTO: 10.4 FL (ref 9–12.9)
RBC # BLD AUTO: 3.88 M/UL (ref 4.2–5.4)
WBC # BLD AUTO: 13.4 K/UL (ref 4.8–10.8)

## 2021-09-01 PROCEDURE — 770002 HCHG ROOM/CARE - OB PRIVATE (112)

## 2021-09-01 PROCEDURE — 700102 HCHG RX REV CODE 250 W/ 637 OVERRIDE(OP): Performed by: ANESTHESIOLOGY

## 2021-09-01 PROCEDURE — 700102 HCHG RX REV CODE 250 W/ 637 OVERRIDE(OP): Performed by: OBSTETRICS & GYNECOLOGY

## 2021-09-01 PROCEDURE — 36415 COLL VENOUS BLD VENIPUNCTURE: CPT

## 2021-09-01 PROCEDURE — A9270 NON-COVERED ITEM OR SERVICE: HCPCS | Performed by: ANESTHESIOLOGY

## 2021-09-01 PROCEDURE — A9270 NON-COVERED ITEM OR SERVICE: HCPCS | Performed by: OBSTETRICS & GYNECOLOGY

## 2021-09-01 PROCEDURE — 85027 COMPLETE CBC AUTOMATED: CPT

## 2021-09-01 RX ORDER — HYDROCODONE BITARTRATE AND ACETAMINOPHEN 10; 325 MG/1; MG/1
1 TABLET ORAL EVERY 4 HOURS PRN
Status: DISCONTINUED | OUTPATIENT
Start: 2021-09-01 | End: 2021-09-04 | Stop reason: HOSPADM

## 2021-09-01 RX ORDER — HYDROCODONE BITARTRATE AND ACETAMINOPHEN 5; 325 MG/1; MG/1
1 TABLET ORAL EVERY 4 HOURS PRN
Status: DISCONTINUED | OUTPATIENT
Start: 2021-09-01 | End: 2021-09-04 | Stop reason: HOSPADM

## 2021-09-01 RX ORDER — ONDANSETRON 2 MG/ML
4 INJECTION INTRAMUSCULAR; INTRAVENOUS EVERY 6 HOURS PRN
Status: DISCONTINUED | OUTPATIENT
Start: 2021-09-01 | End: 2021-09-04 | Stop reason: HOSPADM

## 2021-09-01 RX ORDER — ONDANSETRON 4 MG/1
4 TABLET, ORALLY DISINTEGRATING ORAL EVERY 6 HOURS PRN
Status: DISCONTINUED | OUTPATIENT
Start: 2021-09-01 | End: 2021-09-04 | Stop reason: HOSPADM

## 2021-09-01 RX ORDER — IBUPROFEN 600 MG/1
600 TABLET ORAL EVERY 6 HOURS PRN
Status: DISCONTINUED | OUTPATIENT
Start: 2021-09-01 | End: 2021-09-04 | Stop reason: HOSPADM

## 2021-09-01 RX ADMIN — SIMETHICONE 80 MG: 80 TABLET, CHEWABLE ORAL at 14:49

## 2021-09-01 RX ADMIN — DOCUSATE SODIUM 100 MG: 100 CAPSULE ORAL at 23:29

## 2021-09-01 RX ADMIN — OXYCODONE 10 MG: 5 TABLET ORAL at 10:39

## 2021-09-01 RX ADMIN — DOCUSATE SODIUM 100 MG: 100 CAPSULE ORAL at 08:52

## 2021-09-01 RX ADMIN — ACETAMINOPHEN 1000 MG: 500 TABLET ORAL at 03:48

## 2021-09-01 RX ADMIN — ACETAMINOPHEN 1000 MG: 500 TABLET ORAL at 10:39

## 2021-09-01 RX ADMIN — PRENATAL WITH FERROUS FUM AND FOLIC ACID 1 TABLET: 3080; 920; 120; 400; 22; 1.84; 3; 20; 10; 1; 12; 200; 27; 25; 2 TABLET ORAL at 08:52

## 2021-09-01 RX ADMIN — SIMETHICONE 80 MG: 80 TABLET, CHEWABLE ORAL at 23:29

## 2021-09-01 RX ADMIN — IBUPROFEN 600 MG: 600 TABLET ORAL at 14:48

## 2021-09-01 RX ADMIN — IBUPROFEN 600 MG: 600 TABLET ORAL at 22:21

## 2021-09-01 RX ADMIN — OXYCODONE 10 MG: 5 TABLET ORAL at 03:48

## 2021-09-01 RX ADMIN — HYDROCODONE BITARTRATE AND ACETAMINOPHEN 1 TABLET: 10; 325 TABLET ORAL at 22:25

## 2021-09-01 RX ADMIN — HYDROCODONE BITARTRATE AND ACETAMINOPHEN 1 TABLET: 10; 325 TABLET ORAL at 17:21

## 2021-09-01 ASSESSMENT — PAIN DESCRIPTION - PAIN TYPE
TYPE: SURGICAL PAIN
TYPE: CHRONIC PAIN

## 2021-09-01 NOTE — PROGRESS NOTES
12-hour chart check done.     Bedside report received from May RN @ 0705. Patient is awake and coherent. Assumed care. Will discuss plan of care for today.

## 2021-09-01 NOTE — CARE PLAN
The patient is Stable - Low risk of patient condition declining or worsening    Shift Goals  Clinical Goals: pain management, no emesis, bond with infant  Patient Goals: rest, no pain, be with baby    Progress made toward(s) clinical / shift goals:   Scheduled Tylenol and oxycodone for pain. Reports adequate pain management. Resting and calm.    Patient is not progressing towards the following goals:      Problem: Knowledge Deficit - Postpartum  Goal: Patient will verbalize and demonstrate understanding of self and infant care  Outcome: Progressing  Note: Bonding with infant appropriately.      Problem: Altered Physiologic Condition  Goal: Patient physiologically stable as evidenced by normal lochia, palpable uterine involution and vitals within normal limits  Outcome: Progressing  Note: Fundus firm, lochia light. Vitals WNL.

## 2021-09-01 NOTE — PROGRESS NOTES
1615 Assumed care from labor and delivery. Oriented patient to room, call light, emergency light, TV, bed remote. Assessment completed, fundus firm, lochia light. ABD incision with mepilex dressing dressing intact, scat drainage present. Plan of care reviewed, verbalized understanding. Patient will call if pain med intervention needed.

## 2021-09-01 NOTE — DISCHARGE PLANNING
Discharge Planning Assessment Post Partum    Reason for Referral: History of bipolar and drug use-2018  Address: [address] [place] [address] [place] [address]  Phone: [phone]  Type of Living Situation: living with FOB and children  Mom Diagnosis: Pregnancy,   Baby Diagnosis: Pollock-39.1 weeks  Primary Language: English    Name of Baby: Jai Oscar (: 21)  Father of the Baby: Jacob Oscar  Involved in baby’s care? Yes  Contact Information: [phone]    Prenatal Care: Yes  [facility]    Support System: FOB  Coping/Bonding between mother & baby: Yes  Source of Feeding: bottle feeding  Supplies for Infant: prepared for infant; denies any needs    Mom's Insurance: Medicaid HMO  Baby Covered on Insurance:Yes  Mother Employed/School: [employer]  Other children in the home/names & ages: 13 year old daughter-living with Grandmother and 2 year old son    Financial Hardship/Income: No   Mom's Mental status: alert and oriented  Services used prior to admit: Medicaid, food stamps, and plans to apply for [facility]    CPS History: No  Psychiatric History: history of bipolar.  MOB states she has a Psychiatrist through [facility] that she sees on Zoom and is currently taking Abilify  Domestic Violence History: Denies  Drug/ETOH History: No-past history in 2018.  MOB denies any substance use and tox screens were not collected    Resources Provided: post partum support and counseling resources and a children and family resource list  Referrals Made: diaper bank referral provided     Clearance for Discharge: Infant is cleared to discharge home with mother

## 2021-09-01 NOTE — OP REPORT
DATE OF SERVICE:  2021     INDICATIONS:  The patient is a 30 years old,  4, para 2, one ,   two prior C-sections at term, here for repeat .     PREOPERATIVE DIAGNOSIS:  Term with prior 2  sections.     POSTOPERATIVE DIAGNOSIS:  Term with prior 2  sections.     PROCEDURE PERFORMED:  Repeat low transverse .     ANESTHESIA:  Spinal.     ANESTHESIOLOGIST:  Park Rios MD     SURGEON:  Bonnie Ghotra MD     ASSISTANT:  Carrie Paris MD     ESTIMATED BLOOD LOSS:  600 mL.     DRAINS:  Renteria drained 400 mL of clear urine at the end of the procedure.     COMPLICATIONS:  None.     FINDINGS:  Male infant in cephalic presentation, Apgars are 8 and 9 at 1 and 5   minutes respectively.  Normal uterus, tubes, and ovaries noted.  Dense   adhesions of the fascia to the underlying rectus muscles and also the bladder   to the lower uterine segment.     DESCRIPTION OF PROCEDURE:  The patient was taken to the operating room where   spinal anesthesia was placed and found to be adequate.  Bladder was Renteria   catheterized.  The patient was prepped and draped in the usual sterile fashion   in a dorsal supine position with a leftward tilt.  A Pfannenstiel skin   incision was made with the scalpel, carried through to the underlying layer of   fascia using the Bovie.  Fascia was incised in the midline and extended   laterally using Pina scissors.  Kocher clamps was used to elevate the inferior   aspect of the fascial incision, which was elevated and the underlying rectus   muscles were dissected off bluntly and using Pina scissors.  Attention was   then turned to the superior aspect of the fascial incision, which in a similar   fashion was grasped with Kocher clamps, elevated and the underlying rectus   muscles were dissected off bluntly and using Pina scissors.  Rectus muscles   were  in the midline.  Peritoneum was difficult to identify where the   rectus muscles  were held up with two Allis clamp and using a scalpel the   peritoneum was entered sharply in layers.  After the entry incision was   extended superiorly and inferiorly with good visualization of the bladder, an   Luis retractor was introduced and held in place.  The vesicouterine   peritoneum was identified, held up with pickups and incision was made.    Incision was extended laterally in small segments by sharp dissection using a   ___.  Dense adhesions were noted, so dissection was carried out by sharp   dissection and the bladder was  from the lower uterine segment.   Transverse incision was made in the lower uterine segment and extended   manually.  Clear fluid noted.  Baby was delivered in cephalic presentation   followed by rest of the body.  Delayed cord was clamped and cut.  Baby was   handed over to the awaiting pickup team.  Placenta was delivered complete,   3-vessel cord was noted.  Uterus was cleared of all clots and debris.  The   uterine incision was closed in one layer using 0 chromic sutures.  Hemostasis   was confirmed.  All the instruments were removed.  Peritoneum was closed with   3-0 Vicryl.  Rectus muscles were approximated in the midline.  After assured   hemostasis, fascia was closed with 0 Vicryl, subcutaneous tissue was   approximated with 3-0 Vicryl and the skin was closed with 4-0 Vicryl.    Hemostasis was maintained throughout the procedure.  The patient was   transferred to the recovery room under stable condition.  Sponge and   instrument counts were correct x2.        ______________________________  MD JUAN C Noe/MARTA/TOAN    DD:  08/31/2021 14:50  DT:  08/31/2021 16:04    Job#:  [id number]    CC:Carrie Paris MD(User)

## 2021-09-01 NOTE — PROGRESS NOTES
"Large emesis of undigested food tonight. She relates it to \"after the morphine\". Cold wash rag given.  "

## 2021-09-01 NOTE — PROGRESS NOTES
"Rhonda Lopez PP day 1 POD 1    Subjective: Abdominal pain. yes, ambulating .yes, tolerating liquids .yes, tolerating regular diet .yes, flatus.yes, BM .no, Bleeding .yes, voiding robertson discontinued .no,dizziness .no, breast feeding.no, breast tenderness .no    BP (!) 97/54   Pulse 60   Temp 36.7 °C (98.1 °F) (Oral)   Resp 18   Ht 1.676 m (5' 5.98\")   Wt 112 kg (246 lb 14.6 oz)   SpO2 92%   Breast Exam: Tenderness .no, Engourgement .no, Mastitis .no  Abdomen soft, non-tender. BS normal. No masses,  No organomegaly  Incision: healing well with good reapproximation  Fundus Tenderness:no, Below umbilicus:Yes,   Perineumperineum intact  Extremities2+ edema    Meds:   No current facility-administered medications on file prior to encounter.     Current Outpatient Medications on File Prior to Encounter   Medication Sig Dispense Refill   • ARIPiprazole (ABILIFY) 10 MG Tab Take 10 mg by mouth every day.     • buPROPion (WELLBUTRIN) 100 MG Tab Take 100 mg by mouth 2 times a day.     • albuterol 108 (90 Base) MCG/ACT Aero Soln inhalation aerosol Inhale 2 Puffs every 6 hours as needed for Shortness of Breath.     • docusate sodium 100 MG Cap Take 100 mg by mouth 2 times a day as needed for Constipation. 60 Cap 1   • NS SOLN 60 mL with albuterol 2.5 mg/0.5 mL NEBU 5 mL 5 mg/hr by Nebulization route.     • Prenatal Vit-Fe Fumarate-FA (PRENATAL VITAMINS) 28-0.8 MG Tab Take 1 Tab by mouth every day. 90 Tab 3   • cyclobenzaprine (FLEXERIL) 10 MG TABS Take 1 Tab by mouth 3 times a day as needed for Mild Pain. (Patient not taking: Reported on 11/9/2018) 30 Tab 0       Lab:   Recent Results (from the past 48 hour(s))   Hold Blood Bank Specimen (Not Tested)    Collection Time: 08/31/21 11:00 AM   Result Value Ref Range    Holding Tube - Bb DONE    CBC with Differential    Collection Time: 08/31/21 11:00 AM   Result Value Ref Range    WBC 9.5 4.8 - 10.8 K/uL    RBC 4.52 4.20 - 5.40 M/uL    Hemoglobin 12.6 12.0 - 16.0 g/dL "    Hematocrit 38.2 37.0 - 47.0 %    MCV 84.5 81.4 - 97.8 fL    MCH 27.9 27.0 - 33.0 pg    MCHC 33.0 (L) 33.6 - 35.0 g/dL    RDW 43.5 35.9 - 50.0 fL    Platelet Count 240 164 - 446 K/uL    MPV 9.9 9.0 - 12.9 fL    Neutrophils-Polys 73.80 (H) 44.00 - 72.00 %    Lymphocytes 20.30 (L) 22.00 - 41.00 %    Monocytes 4.60 0.00 - 13.40 %    Eosinophils 0.40 0.00 - 6.90 %    Basophils 0.40 0.00 - 1.80 %    Immature Granulocytes 0.50 0.00 - 0.90 %    Nucleated RBC 0.00 /100 WBC    Neutrophils (Absolute) 7.00 2.00 - 7.15 K/uL    Lymphs (Absolute) 1.93 1.00 - 4.80 K/uL    Monos (Absolute) 0.44 0.00 - 0.85 K/uL    Eos (Absolute) 0.04 0.00 - 0.51 K/uL    Baso (Absolute) 0.04 0.00 - 0.12 K/uL    Immature Granulocytes (abs) 0.05 0.00 - 0.11 K/uL    NRBC (Absolute) 0.00 K/uL   SARS-COV Antigen GUILLE: Collect dry nasal swab    Collection Time: 08/31/21 12:52 PM   Result Value Ref Range    SARS-CoV-2 Source Nasal Swab     SARS-COV ANTIGEN GUILLE NotDetected Not-Detected   CBC without differential    Collection Time: 09/01/21  4:36 AM   Result Value Ref Range    WBC 13.4 (H) 4.8 - 10.8 K/uL    RBC 3.88 (L) 4.20 - 5.40 M/uL    Hemoglobin 10.8 (L) 12.0 - 16.0 g/dL    Hematocrit 33.5 (L) 37.0 - 47.0 %    MCV 86.3 81.4 - 97.8 fL    MCH 27.8 27.0 - 33.0 pg    MCHC 32.2 (L) 33.6 - 35.0 g/dL    RDW 43.8 35.9 - 50.0 fL    Platelet Count 229 164 - 446 K/uL    MPV 10.4 9.0 - 12.9 fL       Assessment and Plan  normal postpartum course  No areas of skin breakdown/redness; surgical incision intact/healing    Continue Routine postpartum care

## 2021-09-02 PROCEDURE — 700102 HCHG RX REV CODE 250 W/ 637 OVERRIDE(OP): Performed by: OBSTETRICS & GYNECOLOGY

## 2021-09-02 PROCEDURE — A9270 NON-COVERED ITEM OR SERVICE: HCPCS | Performed by: OBSTETRICS & GYNECOLOGY

## 2021-09-02 PROCEDURE — 770002 HCHG ROOM/CARE - OB PRIVATE (112)

## 2021-09-02 RX ADMIN — IBUPROFEN 600 MG: 600 TABLET ORAL at 19:39

## 2021-09-02 RX ADMIN — HYDROCODONE BITARTRATE AND ACETAMINOPHEN 1 TABLET: 5; 325 TABLET ORAL at 11:40

## 2021-09-02 RX ADMIN — IBUPROFEN 600 MG: 600 TABLET ORAL at 05:47

## 2021-09-02 RX ADMIN — HYDROCODONE BITARTRATE AND ACETAMINOPHEN 1 TABLET: 5; 325 TABLET ORAL at 16:18

## 2021-09-02 RX ADMIN — DOCUSATE SODIUM 100 MG: 100 CAPSULE ORAL at 19:39

## 2021-09-02 RX ADMIN — PRENATAL WITH FERROUS FUM AND FOLIC ACID 1 TABLET: 3080; 920; 120; 400; 22; 1.84; 3; 20; 10; 1; 12; 200; 27; 25; 2 TABLET ORAL at 09:14

## 2021-09-02 RX ADMIN — SIMETHICONE 80 MG: 80 TABLET, CHEWABLE ORAL at 06:10

## 2021-09-02 RX ADMIN — SIMETHICONE 80 MG: 80 TABLET, CHEWABLE ORAL at 19:39

## 2021-09-02 RX ADMIN — HYDROCODONE BITARTRATE AND ACETAMINOPHEN 1 TABLET: 10; 325 TABLET ORAL at 05:47

## 2021-09-02 RX ADMIN — HYDROCODONE BITARTRATE AND ACETAMINOPHEN 1 TABLET: 10; 325 TABLET ORAL at 02:06

## 2021-09-02 RX ADMIN — IBUPROFEN 600 MG: 600 TABLET ORAL at 11:39

## 2021-09-02 RX ADMIN — SIMETHICONE 80 MG: 80 TABLET, CHEWABLE ORAL at 11:44

## 2021-09-02 ASSESSMENT — PAIN DESCRIPTION - PAIN TYPE
TYPE: SURGICAL PAIN

## 2021-09-02 NOTE — PROGRESS NOTES
Assessment completed WDL. Pt states that pain is well controlled with current pain medications. Plan of care discussed. Pt encouraged to ambulate and call with needs.

## 2021-09-02 NOTE — PROGRESS NOTES
"Rhonda Lopez PP day 2 POD 2    Subjective: Abdominal pain. no, ambulating .yes, tolerating liquids .yes, tolerating regular diet .yes, flatus.yes, BM .yes, Bleeding .no, voiding .yes,dizziness .no, breast feeding.no, breast tenderness .no    /78   Pulse 80   Temp 36.7 °C (98 °F) (Temporal)   Resp 18   Ht 1.676 m (5' 5.98\")   Wt 112 kg (246 lb 14.6 oz)   SpO2 96%   Breast Exam: Tenderness .no, Engourgement .no, Mastitis .no  Abdomen soft, non-tender. BS normal. No masses,  No organomegaly  Incision: healing well with good reapproximation  Fundus Tenderness:no, Below umbilicus:Yes,   Perineumperineum intact  Extremities2+ edema    Meds:   No current facility-administered medications on file prior to encounter.     Current Outpatient Medications on File Prior to Encounter   Medication Sig Dispense Refill   • ARIPiprazole (ABILIFY) 10 MG Tab Take 10 mg by mouth every day.     • buPROPion (WELLBUTRIN) 100 MG Tab Take 100 mg by mouth 2 times a day.     • albuterol 108 (90 Base) MCG/ACT Aero Soln inhalation aerosol Inhale 2 Puffs every 6 hours as needed for Shortness of Breath.     • docusate sodium 100 MG Cap Take 100 mg by mouth 2 times a day as needed for Constipation. 60 Cap 1   • NS SOLN 60 mL with albuterol 2.5 mg/0.5 mL NEBU 5 mL 5 mg/hr by Nebulization route.     • Prenatal Vit-Fe Fumarate-FA (PRENATAL VITAMINS) 28-0.8 MG Tab Take 1 Tab by mouth every day. 90 Tab 3   • cyclobenzaprine (FLEXERIL) 10 MG TABS Take 1 Tab by mouth 3 times a day as needed for Mild Pain. (Patient not taking: Reported on 11/9/2018) 30 Tab 0       Lab:   Recent Results (from the past 48 hour(s))   Hold Blood Bank Specimen (Not Tested)    Collection Time: 08/31/21 11:00 AM   Result Value Ref Range    Holding Tube - Bb DONE    CBC with Differential    Collection Time: 08/31/21 11:00 AM   Result Value Ref Range    WBC 9.5 4.8 - 10.8 K/uL    RBC 4.52 4.20 - 5.40 M/uL    Hemoglobin 12.6 12.0 - 16.0 g/dL    Hematocrit 38.2 " 37.0 - 47.0 %    MCV 84.5 81.4 - 97.8 fL    MCH 27.9 27.0 - 33.0 pg    MCHC 33.0 (L) 33.6 - 35.0 g/dL    RDW 43.5 35.9 - 50.0 fL    Platelet Count 240 164 - 446 K/uL    MPV 9.9 9.0 - 12.9 fL    Neutrophils-Polys 73.80 (H) 44.00 - 72.00 %    Lymphocytes 20.30 (L) 22.00 - 41.00 %    Monocytes 4.60 0.00 - 13.40 %    Eosinophils 0.40 0.00 - 6.90 %    Basophils 0.40 0.00 - 1.80 %    Immature Granulocytes 0.50 0.00 - 0.90 %    Nucleated RBC 0.00 /100 WBC    Neutrophils (Absolute) 7.00 2.00 - 7.15 K/uL    Lymphs (Absolute) 1.93 1.00 - 4.80 K/uL    Monos (Absolute) 0.44 0.00 - 0.85 K/uL    Eos (Absolute) 0.04 0.00 - 0.51 K/uL    Baso (Absolute) 0.04 0.00 - 0.12 K/uL    Immature Granulocytes (abs) 0.05 0.00 - 0.11 K/uL    NRBC (Absolute) 0.00 K/uL   SARS-COV Antigen GUILLE: Collect dry nasal swab    Collection Time: 08/31/21 12:52 PM   Result Value Ref Range    SARS-CoV-2 Source Nasal Swab     SARS-COV ANTIGEN GUILLE NotDetected Not-Detected   CBC without differential    Collection Time: 09/01/21  4:36 AM   Result Value Ref Range    WBC 13.4 (H) 4.8 - 10.8 K/uL    RBC 3.88 (L) 4.20 - 5.40 M/uL    Hemoglobin 10.8 (L) 12.0 - 16.0 g/dL    Hematocrit 33.5 (L) 37.0 - 47.0 %    MCV 86.3 81.4 - 97.8 fL    MCH 27.8 27.0 - 33.0 pg    MCHC 32.2 (L) 33.6 - 35.0 g/dL    RDW 43.8 35.9 - 50.0 fL    Platelet Count 229 164 - 446 K/uL    MPV 10.4 9.0 - 12.9 fL       Assessment and Plan  normal postpartum course  No areas of skin breakdown/redness; surgical incision intact/healing    Continue Routine postpartum care

## 2021-09-02 NOTE — CARE PLAN
The patient is stable at this time. Low risk of patient condition declining or worsening     Shift Goals  Clinical Goals: rest, pain control. maintain normal V/S  Patient Goals: To rest and bond with baby.    Progress made toward(s) clinical / shift goals:  pain control      Problem: Altered Physiologic Condition  Goal: Patient physiologically stable as evidenced by normal lochia, palpable uterine involution and vitals within normal limits  Outcome: Progressing  Note: Fundus firm at U, lochia rubra minimal. Surgical incision with mepilex dressing intact. VSS     Problem: Respiratory/Oxygenation Function Post-Surgical  Goal: Patient will achieve/maintain normal respiratory rate/effort  Outcome: Progressing  Note: Patient has no signs of respiratory distress/SOB noted at this time.      Problem: Respiratory/Oxygenation Function Post-Surgical  Goal: Patient will achieve/maintain normal respiratory rate/effort  Outcome: Progressing  Note: Patient has no signs of respiratory distress/SOB noted at this time.

## 2021-09-03 PROCEDURE — A9270 NON-COVERED ITEM OR SERVICE: HCPCS | Performed by: OBSTETRICS & GYNECOLOGY

## 2021-09-03 PROCEDURE — 700102 HCHG RX REV CODE 250 W/ 637 OVERRIDE(OP): Performed by: OBSTETRICS & GYNECOLOGY

## 2021-09-03 PROCEDURE — 770002 HCHG ROOM/CARE - OB PRIVATE (112)

## 2021-09-03 RX ADMIN — HYDROCODONE BITARTRATE AND ACETAMINOPHEN 1 TABLET: 10; 325 TABLET ORAL at 14:35

## 2021-09-03 RX ADMIN — PRENATAL WITH FERROUS FUM AND FOLIC ACID 1 TABLET: 3080; 920; 120; 400; 22; 1.84; 3; 20; 10; 1; 12; 200; 27; 25; 2 TABLET ORAL at 07:59

## 2021-09-03 RX ADMIN — HYDROCODONE BITARTRATE AND ACETAMINOPHEN 1 TABLET: 10; 325 TABLET ORAL at 09:34

## 2021-09-03 RX ADMIN — SIMETHICONE 80 MG: 80 TABLET, CHEWABLE ORAL at 06:16

## 2021-09-03 RX ADMIN — HYDROCODONE BITARTRATE AND ACETAMINOPHEN 1 TABLET: 5; 325 TABLET ORAL at 06:16

## 2021-09-03 RX ADMIN — IBUPROFEN 600 MG: 600 TABLET ORAL at 19:58

## 2021-09-03 RX ADMIN — HYDROCODONE BITARTRATE AND ACETAMINOPHEN 1 TABLET: 10; 325 TABLET ORAL at 01:55

## 2021-09-03 RX ADMIN — IBUPROFEN 600 MG: 600 TABLET ORAL at 01:55

## 2021-09-03 RX ADMIN — IBUPROFEN 600 MG: 600 TABLET ORAL at 09:34

## 2021-09-03 RX ADMIN — HYDROCODONE BITARTRATE AND ACETAMINOPHEN 1 TABLET: 10; 325 TABLET ORAL at 19:58

## 2021-09-03 ASSESSMENT — PAIN DESCRIPTION - PAIN TYPE
TYPE: ACUTE PAIN
TYPE: SURGICAL PAIN
TYPE: SURGICAL PAIN
TYPE: ACUTE PAIN
TYPE: SURGICAL PAIN

## 2021-09-03 ASSESSMENT — EDINBURGH POSTNATAL DEPRESSION SCALE (EPDS)
I HAVE FELT SAD OR MISERABLE: NO, NOT AT ALL
I HAVE FELT SCARED OR PANICKY FOR NO GOOD REASON: NO, NOT AT ALL
THINGS HAVE BEEN GETTING ON TOP OF ME: NO, I HAVE BEEN COPING AS WELL AS EVER
I HAVE BEEN ANXIOUS OR WORRIED FOR NO GOOD REASON: NO, NOT AT ALL
I HAVE LOOKED FORWARD WITH ENJOYMENT TO THINGS: AS MUCH AS I EVER DID
THE THOUGHT OF HARMING MYSELF HAS OCCURRED TO ME: NEVER
I HAVE BEEN ABLE TO LAUGH AND SEE THE FUNNY SIDE OF THINGS: AS MUCH AS I ALWAYS COULD
I HAVE BLAMED MYSELF UNNECESSARILY WHEN THINGS WENT WRONG: NO, NEVER
I HAVE BEEN SO UNHAPPY THAT I HAVE BEEN CRYING: NO, NEVER
I HAVE BEEN SO UNHAPPY THAT I HAVE HAD DIFFICULTY SLEEPING: NOT AT ALL

## 2021-09-03 NOTE — CARE PLAN
The patient is Stable - Low risk of patient condition declining or worsening    Shift Goals  Clinical Goals: pain control, rest, ambulation  Patient Goals: To rest and bond with baby.    Progress made toward(s) clinical / shift goals:  Pt states that pain is well controlled with current pain medications. Pt up in room caring for infant and self well.

## 2021-09-03 NOTE — CARE PLAN
The patient is stable. Low risk of patient condition declining or worsening    Shift Goals  Clinical Goals: rest, pain control.   Patient Goals: To rest and bond with baby.    Progress made toward(s) clinical / shift goals: pain control    Problem: Pain - Standard  Goal: Alleviation of pain or a reduction in pain to the patient’s comfort goal  Outcome: Progressing  Note: Patient verbalized acceptable pain level at this time. Will be medicated as needed.      Problem: Altered Physiologic Condition  Goal: Patient physiologically stable as evidenced by normal lochia, palpable uterine involution and vitals within normal limits  Outcome: Progressing  Note: Fundus firm at U, lochia rubra minimal. Surgical incision with mepilex dressing intact. VSS     Problem: Altered Physiologic Condition  Goal: Patient physiologically stable as evidenced by normal lochia, palpable uterine involution and vitals within normal limits  Outcome: Progressing  Note: Fundus firm at U, lochia rubra minimal. Surgical incision with mepilex dressing intact. VSS

## 2021-09-03 NOTE — PROGRESS NOTES
Post Partum Progress Note    Name:   Rhonda Lopez   Date/Time:  9/3/2021 - 8:49 AM  Chief Admitting Dx:  Indication for care in labor or delivery [O75.9]  Delivery Type:   for repeat  Post-Op/Post Partum Days #:  3    Subjective:  Abdominal pain: yes  Ambulating:   yes  Tolerating liquids:  yes  Tolerating food:  yes common adult  Flatus:   yes  BM:    no  Bleeding:   with a moderate amount of bleeding  Voiding:   yes  Dizziness:   no  Feeding:   Bottle-wants to pump and supplement    Vitals:    21 1800 21 0549 21 1739 21 0600   BP: 119/76 125/78 120/90 131/77   Pulse: 71 80 80 79   Resp: 20 18 18 16   Temp: 37.1 °C (98.8 °F) 36.7 °C (98 °F) 36.9 °C (98.4 °F) 36.3 °C (97.4 °F)   TempSrc: Oral Temporal Temporal Temporal   SpO2: 96% 96% 95% 97%   Weight:       Height:           Exam:  Breast: Lactating yes  Abdomen: Abdomen soft, non-tender. BS normal. No masses,  No organomegaly  Fundal Tenderness:  yes  Fundus Firm: no  Incision: dry and intact  Below umbilicus: yes  Perineum: perineum intact  Lochia: moderate  Extremities: 1+ edema extremities, peripheral pulses and reflexes normal    Meds:  Current Facility-Administered Medications   Medication Dose   • ibuprofen (MOTRIN) tablet 600 mg  600 mg   • HYDROcodone-acetaminophen (NORCO) 5-325 MG per tablet 1 Tablet  1 Tablet   • HYDROcodone/acetaminophen (NORCO)  MG per tablet 1 Tablet  1 Tablet   • ondansetron (ZOFRAN) syringe/vial injection 4 mg  4 mg    Or   • ondansetron (ZOFRAN ODT) dispertab 4 mg  4 mg   • LR infusion     • oxytocin (PITOCIN) infusion (for postpartum)   mL/hr   • LR infusion     • PRN oxytocin (PITOCIN) (20 Units/1000 mL) PRN for excessive uterine bleeding - See Admin Instr  125-999 mL/hr   • miSOPROStol (CYTOTEC) tablet 600 mcg  600 mcg   • docusate sodium (COLACE) capsule 100 mg  100 mg   • prenatal plus vitamin (STUARTNATAL 1+1) 27-1 MG tablet 1 Tablet  1 Tablet   • simethicone (MYLICON)  chewable tab 80 mg  80 mg       Labs:   Recent Labs     21  1100 21  0436   WBC 9.5 13.4*   RBC 4.52 3.88*   HEMOGLOBIN 12.6 10.8*   HEMATOCRIT 38.2 33.5*   MCV 84.5 86.3   MCH 27.9 27.8   MCHC 33.0* 32.2*   RDW 43.5 43.8   PLATELETCT 240 229   MPV 9.9 10.4       Assessment:  Chief Admitting Dx:  Indication for care in labor or delivery [O75.9]  Delivery Type:   for repeat  Tubal Ligation:  no    Plan:  Continue routine post partum care.  Discharge planned for tomorrow    LUZ Springer.

## 2021-09-03 NOTE — PROGRESS NOTES
Assessment done. Vital signs stable. Patient voiding without difficulty, claims to be passing flatus. Fundus firm at umbilicus with light lochia. Dressing over low abdominal incision  Patient ambulating with steady gait. Patient claims to have good pain relief with p.o meds. Bottle feeding infant on demand. Pt claims she will call for medications or any needs

## 2021-09-04 VITALS
RESPIRATION RATE: 19 BRPM | DIASTOLIC BLOOD PRESSURE: 77 MMHG | BODY MASS INDEX: 39.68 KG/M2 | SYSTOLIC BLOOD PRESSURE: 126 MMHG | HEART RATE: 66 BPM | HEIGHT: 66 IN | OXYGEN SATURATION: 97 % | TEMPERATURE: 97 F | WEIGHT: 246.91 LBS

## 2021-09-04 PROCEDURE — 700102 HCHG RX REV CODE 250 W/ 637 OVERRIDE(OP): Performed by: OBSTETRICS & GYNECOLOGY

## 2021-09-04 PROCEDURE — A9270 NON-COVERED ITEM OR SERVICE: HCPCS | Performed by: OBSTETRICS & GYNECOLOGY

## 2021-09-04 RX ORDER — HYDROCODONE BITARTRATE AND ACETAMINOPHEN 10; 325 MG/1; MG/1
1 TABLET ORAL EVERY 4 HOURS PRN
Qty: 20 TABLET | Refills: 0 | Status: SHIPPED | OUTPATIENT
Start: 2021-09-04 | End: 2021-09-11

## 2021-09-04 RX ORDER — IBUPROFEN 600 MG/1
600 TABLET ORAL EVERY 6 HOURS PRN
Qty: 30 TABLET | Refills: 0 | Status: SHIPPED | OUTPATIENT
Start: 2021-09-04

## 2021-09-04 RX ADMIN — IBUPROFEN 600 MG: 600 TABLET ORAL at 09:06

## 2021-09-04 RX ADMIN — PRENATAL WITH FERROUS FUM AND FOLIC ACID 1 TABLET: 3080; 920; 120; 400; 22; 1.84; 3; 20; 10; 1; 12; 200; 27; 25; 2 TABLET ORAL at 09:06

## 2021-09-04 RX ADMIN — HYDROCODONE BITARTRATE AND ACETAMINOPHEN 1 TABLET: 10; 325 TABLET ORAL at 09:06

## 2021-09-04 RX ADMIN — HYDROCODONE BITARTRATE AND ACETAMINOPHEN 1 TABLET: 10; 325 TABLET ORAL at 00:18

## 2021-09-04 RX ADMIN — HYDROCODONE BITARTRATE AND ACETAMINOPHEN 1 TABLET: 10; 325 TABLET ORAL at 04:20

## 2021-09-04 RX ADMIN — IBUPROFEN 600 MG: 600 TABLET ORAL at 02:02

## 2021-09-04 ASSESSMENT — PAIN DESCRIPTION - PAIN TYPE
TYPE: ACUTE PAIN;SURGICAL PAIN
TYPE: ACUTE PAIN;SURGICAL PAIN

## 2021-09-04 NOTE — PROGRESS NOTES
Cuddles removed. Bands verified. Car seat checked. Patient and infant escorted off the unit, declines escort to car.

## 2021-09-04 NOTE — CARE PLAN
The patient is Stable - Low risk of patient condition declining or worsening    Shift Goals  Clinical Goals: lochia wdl  Patient Goals: To rest and bond with baby.    Progress made toward(s) clinical / shift goals:  FF@U with light lochia    Patient is not progressing towards the following goals:

## 2021-09-04 NOTE — PROGRESS NOTES
"0700:Report received from Shawna HOWARD. Assumed Patient care. Patient appears calm and in no acute distress. Labs and orders reviewed.     0900:Assessment completed. Fundus firm, scant lochia noted. Mepilex silver dressing in place to incision, dressing dry, intact, with old drainage noted. Patient states 9/10 lower abdomen;incision pain, patient requesting PRN pain medication, patient medicated refer to MAR. Patient educated to notify RN if pain is worse, patient verbalizes understanding. Patient provided with scheduled medication, refer to MAR. Plan of care discussed with patient; questions have been answered at this time. Patient is primarily bottle feeding infant, patient declines needing assistance with feeding. Patient needs have been met at this time. Patient encouraged to call when needing assistance. Call light within reach. Rounding in place. /77   Pulse 66   Temp 36.1 °C (97 °F) (Temporal)   Resp 19   Ht 1.676 m (5' 5.98\")   Wt 112 kg (246 lb 14.6 oz)   SpO2 97%   Breastfeeding No   BMI 39.87 kg/m² .   "

## 2021-09-04 NOTE — PROGRESS NOTES
Discharge education and paperwork reviewed with patient verbalizes understanding. Discharge paperwork signed.

## 2021-09-04 NOTE — PROGRESS NOTES
Assumed care of patient. Assessment complete. Fundus is firm, with scant to light lochia rubra. Pain level is 7/10, medicated per MAR. Bed is locked and in low position. Call light left within reach and encouraged to call for any needs if necessary.

## 2021-09-04 NOTE — PROGRESS NOTES
Progress Note    Subjective:   Doing well. No issues or concerns. Pain well controlled. No sig bleeding or discharge.     Objective Data:            Vitals:    09/03/21 0600 09/03/21 0800 09/03/21 1800 09/04/21 0600   BP: 131/77  126/83 126/77   Pulse: 79 71 70 66   Resp: 16 18 18 19   Temp: 36.3 °C (97.4 °F)  37 °C (98.6 °F) 36.1 °C (97 °F)   TempSrc: Temporal  Temporal Temporal   SpO2: 97%  97%    Weight:       Height:         Abdomen: soft non tender fundus at umbilicus  Perineum: no sig bleeding or discharge  Ext: non tender calves    No intake or output data in the 24 hours ending 09/04/21 1301    Current Facility-Administered Medications   Medication Dose Route Frequency Provider Last Rate Last Admin   • ibuprofen (MOTRIN) tablet 600 mg  600 mg Oral Q6HRS PRN Bonnie Ghotra M.D.   600 mg at 09/04/21 0906   • HYDROcodone-acetaminophen (NORCO) 5-325 MG per tablet 1 Tablet  1 Tablet Oral Q4HRS PRN Bonnie Ghotra M.D.   1 Tablet at 09/03/21 0616   • HYDROcodone/acetaminophen (NORCO)  MG per tablet 1 Tablet  1 Tablet Oral Q4HRS PRN Bonnie Ghotra M.D.   1 Tablet at 09/04/21 0906   • ondansetron (ZOFRAN) syringe/vial injection 4 mg  4 mg Intravenous Q6HRS PRN Bonnie Ghotra M.D.        Or   • ondansetron (ZOFRAN ODT) dispertab 4 mg  4 mg Oral Q6HRS PRN Bonnie Ghotra M.D.       • LR infusion   Intravenous Continuous Bonnie Ghotra M.D.       • oxytocin (PITOCIN) infusion (for postpartum)   mL/hr Intravenous Continuous Bonnie Ghotra M.D. 125 mL/hr at 08/31/21 1520 125 mL/hr at 08/31/21 1520   • LR infusion   Intravenous PRN Bonnie Ghotra M.D.       • PRN oxytocin (PITOCIN) (20 Units/1000 mL) PRN for excessive uterine bleeding - See Admin Instr  125-999 mL/hr Intravenous Once PRN Bonnie Ghotra M.D.       • miSOPROStol (CYTOTEC) tablet 600 mcg  600 mcg Rectal Once PRN Bonnie Ghotra M.D.       • docusate sodium (COLACE) capsule 100 mg  100 mg Oral BID PRN Bonnie  SHOLA Ghotra   100 mg at 09/02/21 1939   • prenatal plus vitamin (STUARTNATAL 1+1) 27-1 MG tablet 1 Tablet  1 Tablet Oral Daily-0800 Bonnie Ghotra M.D.   1 Tablet at 09/04/21 0906   • simethicone (MYLICON) chewable tab 80 mg  80 mg Oral TID PRN Bonnie Ghotra M.D.   80 mg at 09/03/21 0616       A/P S/P Repeat LTCS now POD #4. Plan to proceed with the discharge home today with f/u in 2 weeks with Dr. Ghotra. All questions were answered. Patient was given Norco and Motrin at the time of discharge today.

## 2021-09-04 NOTE — CARE PLAN
The patient is Stable - Low risk of patient condition declining or worsening    Shift Goals  Clinical Goals: paitent will have accepatable pain level, paitnet fundus will be firm with light lochia    Progress made toward(s) clinical / shift goals:  Yes. Patient pain being managed with PRN pain medication, refer to MAR. Patient is able to ambulate and provide care for self and infant. Patient fundus noted to be firm with light lochia.     Patient is not progressing towards the following goals: N/A

## 2021-09-04 NOTE — CARE PLAN
The patient is Stable - Low risk of patient condition declining or worsening    Shift Goals  Clinical Goals: vitals WNL, pain control, bonding  Patient Goals: To rest and bond with baby.    Progress made toward(s) clinical / shift goals:  vitals remain stable. Pain controlled with medications and rest. Bonding appropriately.     Patient is not progressing towards the following goals:      Problem: Altered Physiologic Condition  Goal: Patient physiologically stable as evidenced by normal lochia, palpable uterine involution and vitals within normal limits  Outcome: Progressing  Note: Fundus firm, lochia light and rubra. No clots.      Problem: Infection - Postpartum  Goal: Postpartum patient will be free of signs and symptoms of infection  Outcome: Progressing  Note: No s/s of infection present on assessment. Afebrile.

## 2021-09-05 NOTE — DISCHARGE SUMMARY
DATE OF ADMISSION:  2021   DATE OF DISCHARGE:  2021     DISCHARGE DIAGNOSES:  1.  Status post an elective repeat low transverse  section.  2.  Uncomplicated postpartum and postoperative course.     HISTORY OF PRESENT ILLNESS:  This is a 30-year-old woman  4, para 2,   with a history of 2 previous  sections, now electing to proceed   forward with elective repeat low transverse  section.  Risks, benefits   and alternatives have been addressed with Dr. Ghotra and she wished to   proceed forward with the delivery as planned.     PAST MEDICAL HISTORY: Can be found in dictated history and physical.     PHYSICAL EXAMINATION:  Can be found in dictated history and physical.     ASSESSMENT AND PLAN:  A 30-year-old  4, para 2, who is electing to   proceed forward with elective repeat low transverse  section at 39   weeks' gestation.  Risks, benefits and alternatives have been addressed, she   had asked appropriate questions and wished to proceed forward with delivery as   planned.     HOSPITAL COURSE:  As stated above, the patient was admitted.  She did undergo   elective repeat low transverse  section with an estimated blood loss   of 600 mL. At the time of surgery, normal uterus, bilateral fallopian tubes,   and ovaries were appreciated.  There were dense adhesions of the rectus fascia   to the underlying muscle, adhesions of the bladder to the lower uterine   segment.  The patient did well and continued to progress postoperatively.  On   postoperative day #4, she was ambulating, voiding well, tolerating a regular   diet.  Her pain was well controlled.  She was felt to be appropriate for   discharge with a discharge plan to follow up in 2 weeks with Dr. Ghotra.    The patient was given Motrin and Norco at the time of discharge.     DISCHARGE INSTRUCTIONS:  She is to call with any increased temperature greater   than 100.4, increasing vaginal bleeding,  abdominal pain unrelieved with any   p.o. pain medication or to call with any other questions or concerns.        ______________________________  MD ERIC Corona/GARRICK    DD:  09/04/2021 13:06  DT:  09/04/2021 17:58    Job#:  [id number]

## 2023-08-23 ENCOUNTER — APPOINTMENT (OUTPATIENT)
Dept: RADIOLOGY | Facility: IMAGING CENTER | Age: 33
End: 2023-08-23
Payer: COMMERCIAL

## 2023-08-23 DIAGNOSIS — N91.2 AMENORRHEA: ICD-10-CM

## 2023-08-23 PROCEDURE — 76801 OB US < 14 WKS SINGLE FETUS: CPT | Mod: TC

## 2023-08-23 PROCEDURE — 76817 TRANSVAGINAL US OBSTETRIC: CPT | Mod: TC

## 2023-09-11 ENCOUNTER — APPOINTMENT (OUTPATIENT)
Dept: OBGYN | Facility: CLINIC | Age: 33
End: 2023-09-11
Payer: COMMERCIAL

## 2024-04-05 ENCOUNTER — ANESTHESIA EVENT (OUTPATIENT)
Dept: OBGYN | Facility: MEDICAL CENTER | Age: 34
End: 2024-04-05
Payer: COMMERCIAL

## 2024-04-08 ENCOUNTER — HOSPITAL ENCOUNTER (INPATIENT)
Facility: MEDICAL CENTER | Age: 34
LOS: 4 days | End: 2024-04-12
Attending: OBSTETRICS & GYNECOLOGY | Admitting: OBSTETRICS & GYNECOLOGY
Payer: COMMERCIAL

## 2024-04-08 DIAGNOSIS — G89.18 POSTOPERATIVE PAIN: ICD-10-CM

## 2024-04-08 LAB
ALBUMIN SERPL BCP-MCNC: 3.5 G/DL (ref 3.2–4.9)
ALBUMIN/GLOB SERPL: 1.2 G/DL
ALP SERPL-CCNC: 187 U/L (ref 30–99)
ALT SERPL-CCNC: 11 U/L (ref 2–50)
ANION GAP SERPL CALC-SCNC: 13 MMOL/L (ref 7–16)
AST SERPL-CCNC: 19 U/L (ref 12–45)
BASOPHILS # BLD AUTO: 0.4 % (ref 0–1.8)
BASOPHILS # BLD: 0.03 K/UL (ref 0–0.12)
BILIRUB SERPL-MCNC: <0.2 MG/DL (ref 0.1–1.5)
BUN SERPL-MCNC: 9 MG/DL (ref 8–22)
CALCIUM ALBUM COR SERPL-MCNC: 9.1 MG/DL (ref 8.5–10.5)
CALCIUM SERPL-MCNC: 8.7 MG/DL (ref 8.5–10.5)
CHLORIDE SERPL-SCNC: 105 MMOL/L (ref 96–112)
CO2 SERPL-SCNC: 17 MMOL/L (ref 20–33)
CREAT SERPL-MCNC: 0.36 MG/DL (ref 0.5–1.4)
EOSINOPHIL # BLD AUTO: 0.07 K/UL (ref 0–0.51)
EOSINOPHIL NFR BLD: 0.8 % (ref 0–6.9)
ERYTHROCYTE [DISTWIDTH] IN BLOOD BY AUTOMATED COUNT: 46.8 FL (ref 35.9–50)
GFR SERPLBLD CREATININE-BSD FMLA CKD-EPI: 135 ML/MIN/1.73 M 2
GLOBULIN SER CALC-MCNC: 3 G/DL (ref 1.9–3.5)
GLUCOSE SERPL-MCNC: 126 MG/DL (ref 65–99)
HCT VFR BLD AUTO: 37 % (ref 37–47)
HGB BLD-MCNC: 12.2 G/DL (ref 12–16)
HOLDING TUBE BB 8507: NORMAL
IMM GRANULOCYTES # BLD AUTO: 0.03 K/UL (ref 0–0.11)
IMM GRANULOCYTES NFR BLD AUTO: 0.4 % (ref 0–0.9)
LYMPHOCYTES # BLD AUTO: 1.83 K/UL (ref 1–4.8)
LYMPHOCYTES NFR BLD: 21.7 % (ref 22–41)
MCH RBC QN AUTO: 25.7 PG (ref 27–33)
MCHC RBC AUTO-ENTMCNC: 33 G/DL (ref 32.2–35.5)
MCV RBC AUTO: 78.1 FL (ref 81.4–97.8)
MONOCYTES # BLD AUTO: 0.49 K/UL (ref 0–0.85)
MONOCYTES NFR BLD AUTO: 5.8 % (ref 0–13.4)
NEUTROPHILS # BLD AUTO: 6 K/UL (ref 1.82–7.42)
NEUTROPHILS NFR BLD: 70.9 % (ref 44–72)
NRBC # BLD AUTO: 0 K/UL
NRBC BLD-RTO: 0 /100 WBC (ref 0–0.2)
PLATELET # BLD AUTO: 264 K/UL (ref 164–446)
PMV BLD AUTO: 9.4 FL (ref 9–12.9)
POTASSIUM SERPL-SCNC: 3.9 MMOL/L (ref 3.6–5.5)
PROT SERPL-MCNC: 6.5 G/DL (ref 6–8.2)
RBC # BLD AUTO: 4.74 M/UL (ref 4.2–5.4)
SODIUM SERPL-SCNC: 135 MMOL/L (ref 135–145)
T PALLIDUM AB SER QL IA: REACTIVE
WBC # BLD AUTO: 8.5 K/UL (ref 4.8–10.8)

## 2024-04-08 PROCEDURE — 85025 COMPLETE CBC W/AUTO DIFF WBC: CPT

## 2024-04-08 PROCEDURE — 700105 HCHG RX REV CODE 258: Performed by: ANESTHESIOLOGY

## 2024-04-08 PROCEDURE — 302449 STATCHG TRIAGE ONLY (STATISTIC)

## 2024-04-08 PROCEDURE — 36415 COLL VENOUS BLD VENIPUNCTURE: CPT

## 2024-04-08 PROCEDURE — 86592 SYPHILIS TEST NON-TREP QUAL: CPT

## 2024-04-08 PROCEDURE — 770002 HCHG ROOM/CARE - OB PRIVATE (112)

## 2024-04-08 PROCEDURE — 86780 TREPONEMA PALLIDUM: CPT

## 2024-04-08 PROCEDURE — 80053 COMPREHEN METABOLIC PANEL: CPT

## 2024-04-08 RX ORDER — CITRIC ACID/SODIUM CITRATE 334-500MG
30 SOLUTION, ORAL ORAL ONCE
Status: COMPLETED | OUTPATIENT
Start: 2024-04-08 | End: 2024-04-09

## 2024-04-08 RX ORDER — SODIUM CHLORIDE, SODIUM LACTATE, POTASSIUM CHLORIDE, CALCIUM CHLORIDE 600; 310; 30; 20 MG/100ML; MG/100ML; MG/100ML; MG/100ML
INJECTION, SOLUTION INTRAVENOUS CONTINUOUS
Status: DISCONTINUED | OUTPATIENT
Start: 2024-04-08 | End: 2024-04-09

## 2024-04-08 RX ORDER — METOCLOPRAMIDE HYDROCHLORIDE 5 MG/ML
10 INJECTION INTRAMUSCULAR; INTRAVENOUS ONCE
Status: COMPLETED | OUTPATIENT
Start: 2024-04-08 | End: 2024-04-09

## 2024-04-08 RX ORDER — SODIUM CHLORIDE, SODIUM GLUCONATE, SODIUM ACETATE, POTASSIUM CHLORIDE AND MAGNESIUM CHLORIDE 526; 502; 368; 37; 30 MG/100ML; MG/100ML; MG/100ML; MG/100ML; MG/100ML
1500 INJECTION, SOLUTION INTRAVENOUS ONCE
Status: COMPLETED | OUTPATIENT
Start: 2024-04-08 | End: 2024-04-09

## 2024-04-08 RX ORDER — CEFAZOLIN SODIUM 1 G/3ML
2 INJECTION, POWDER, FOR SOLUTION INTRAMUSCULAR; INTRAVENOUS ONCE
Status: DISCONTINUED | OUTPATIENT
Start: 2024-04-08 | End: 2024-04-09 | Stop reason: HOSPADM

## 2024-04-08 RX ADMIN — SODIUM CHLORIDE, SODIUM GLUCONATE, SODIUM ACETATE, POTASSIUM CHLORIDE AND MAGNESIUM CHLORIDE 1500 ML: 526; 502; 368; 37; 30 INJECTION, SOLUTION INTRAVENOUS at 23:00

## 2024-04-09 LAB
ERYTHROCYTE [DISTWIDTH] IN BLOOD BY AUTOMATED COUNT: 46.5 FL (ref 35.9–50)
HCT VFR BLD AUTO: 34.1 % (ref 37–47)
HGB BLD-MCNC: 10.9 G/DL (ref 12–16)
MCH RBC QN AUTO: 25.1 PG (ref 27–33)
MCHC RBC AUTO-ENTMCNC: 32 G/DL (ref 32.2–35.5)
MCV RBC AUTO: 78.6 FL (ref 81.4–97.8)
PLATELET # BLD AUTO: 252 K/UL (ref 164–446)
PMV BLD AUTO: 9.5 FL (ref 9–12.9)
RBC # BLD AUTO: 4.34 M/UL (ref 4.2–5.4)
RPR SER QL: NON REACTIVE
WBC # BLD AUTO: 13.3 K/UL (ref 4.8–10.8)

## 2024-04-09 PROCEDURE — 160035 HCHG PACU - 1ST 60 MINS PHASE I: Performed by: OBSTETRICS & GYNECOLOGY

## 2024-04-09 PROCEDURE — 90707 MMR VACCINE SC: CPT | Performed by: OBSTETRICS & GYNECOLOGY

## 2024-04-09 PROCEDURE — 700102 HCHG RX REV CODE 250 W/ 637 OVERRIDE(OP): Performed by: OBSTETRICS & GYNECOLOGY

## 2024-04-09 PROCEDURE — 160041 HCHG SURGERY MINUTES - EA ADDL 1 MIN LEVEL 4: Performed by: OBSTETRICS & GYNECOLOGY

## 2024-04-09 PROCEDURE — C1755 CATHETER, INTRASPINAL: HCPCS | Performed by: OBSTETRICS & GYNECOLOGY

## 2024-04-09 PROCEDURE — 36415 COLL VENOUS BLD VENIPUNCTURE: CPT

## 2024-04-09 PROCEDURE — A9270 NON-COVERED ITEM OR SERVICE: HCPCS | Performed by: OBSTETRICS & GYNECOLOGY

## 2024-04-09 PROCEDURE — 700111 HCHG RX REV CODE 636 W/ 250 OVERRIDE (IP): Performed by: OBSTETRICS & GYNECOLOGY

## 2024-04-09 PROCEDURE — 160009 HCHG ANES TIME/MIN: Performed by: OBSTETRICS & GYNECOLOGY

## 2024-04-09 PROCEDURE — 700111 HCHG RX REV CODE 636 W/ 250 OVERRIDE (IP): Performed by: ANESTHESIOLOGY

## 2024-04-09 PROCEDURE — 3E0234Z INTRODUCTION OF SERUM, TOXOID AND VACCINE INTO MUSCLE, PERCUTANEOUS APPROACH: ICD-10-PCS | Performed by: OBSTETRICS & GYNECOLOGY

## 2024-04-09 PROCEDURE — A9270 NON-COVERED ITEM OR SERVICE: HCPCS | Performed by: ANESTHESIOLOGY

## 2024-04-09 PROCEDURE — 85027 COMPLETE CBC AUTOMATED: CPT

## 2024-04-09 PROCEDURE — 700102 HCHG RX REV CODE 250 W/ 637 OVERRIDE(OP): Performed by: ANESTHESIOLOGY

## 2024-04-09 PROCEDURE — 160029 HCHG SURGERY MINUTES - 1ST 30 MINS LEVEL 4: Performed by: OBSTETRICS & GYNECOLOGY

## 2024-04-09 PROCEDURE — 700105 HCHG RX REV CODE 258: Performed by: OBSTETRICS & GYNECOLOGY

## 2024-04-09 PROCEDURE — 160002 HCHG RECOVERY MINUTES (STAT): Performed by: OBSTETRICS & GYNECOLOGY

## 2024-04-09 PROCEDURE — 160048 HCHG OR STATISTICAL LEVEL 1-5: Performed by: OBSTETRICS & GYNECOLOGY

## 2024-04-09 PROCEDURE — 700111 HCHG RX REV CODE 636 W/ 250 OVERRIDE (IP): Mod: JZ | Performed by: ANESTHESIOLOGY

## 2024-04-09 PROCEDURE — 700105 HCHG RX REV CODE 258: Performed by: ANESTHESIOLOGY

## 2024-04-09 PROCEDURE — 770002 HCHG ROOM/CARE - OB PRIVATE (112)

## 2024-04-09 PROCEDURE — 90471 IMMUNIZATION ADMIN: CPT

## 2024-04-09 RX ORDER — EPHEDRINE SULFATE 50 MG/ML
5 INJECTION, SOLUTION INTRAVENOUS
Status: DISCONTINUED | OUTPATIENT
Start: 2024-04-09 | End: 2024-04-09 | Stop reason: HOSPADM

## 2024-04-09 RX ORDER — DIPHENHYDRAMINE HCL 25 MG
25 TABLET ORAL EVERY 6 HOURS PRN
Status: DISCONTINUED | OUTPATIENT
Start: 2024-04-10 | End: 2024-04-12 | Stop reason: HOSPADM

## 2024-04-09 RX ORDER — KETOROLAC TROMETHAMINE 15 MG/ML
15 INJECTION, SOLUTION INTRAMUSCULAR; INTRAVENOUS EVERY 6 HOURS
Status: COMPLETED | OUTPATIENT
Start: 2024-04-09 | End: 2024-04-10

## 2024-04-09 RX ORDER — HALOPERIDOL 5 MG/ML
1 INJECTION INTRAMUSCULAR
Status: DISCONTINUED | OUTPATIENT
Start: 2024-04-09 | End: 2024-04-09 | Stop reason: HOSPADM

## 2024-04-09 RX ORDER — HYDROMORPHONE HYDROCHLORIDE 1 MG/ML
0.1 INJECTION, SOLUTION INTRAMUSCULAR; INTRAVENOUS; SUBCUTANEOUS
Status: DISCONTINUED | OUTPATIENT
Start: 2024-04-09 | End: 2024-04-09 | Stop reason: HOSPADM

## 2024-04-09 RX ORDER — ONDANSETRON 2 MG/ML
4 INJECTION INTRAMUSCULAR; INTRAVENOUS EVERY 6 HOURS PRN
Status: ACTIVE | OUTPATIENT
Start: 2024-04-09 | End: 2024-04-10

## 2024-04-09 RX ORDER — CEFAZOLIN SODIUM 1 G/3ML
INJECTION, POWDER, FOR SOLUTION INTRAMUSCULAR; INTRAVENOUS PRN
Status: DISCONTINUED | OUTPATIENT
Start: 2024-04-09 | End: 2024-04-09 | Stop reason: SURG

## 2024-04-09 RX ORDER — HYDROMORPHONE HYDROCHLORIDE 1 MG/ML
0.2 INJECTION, SOLUTION INTRAMUSCULAR; INTRAVENOUS; SUBCUTANEOUS
Status: ACTIVE | OUTPATIENT
Start: 2024-04-09 | End: 2024-04-10

## 2024-04-09 RX ORDER — OXYCODONE HCL 5 MG/5 ML
5 SOLUTION, ORAL ORAL
Status: DISCONTINUED | OUTPATIENT
Start: 2024-04-09 | End: 2024-04-09 | Stop reason: HOSPADM

## 2024-04-09 RX ORDER — DIPHENHYDRAMINE HYDROCHLORIDE 50 MG/ML
25 INJECTION INTRAMUSCULAR; INTRAVENOUS EVERY 6 HOURS PRN
Status: ACTIVE | OUTPATIENT
Start: 2024-04-09 | End: 2024-04-10

## 2024-04-09 RX ORDER — SODIUM CHLORIDE, SODIUM GLUCONATE, SODIUM ACETATE, POTASSIUM CHLORIDE AND MAGNESIUM CHLORIDE 526; 502; 368; 37; 30 MG/100ML; MG/100ML; MG/100ML; MG/100ML; MG/100ML
INJECTION, SOLUTION INTRAVENOUS
Status: DISCONTINUED | OUTPATIENT
Start: 2024-04-09 | End: 2024-04-09 | Stop reason: SURG

## 2024-04-09 RX ORDER — SODIUM CHLORIDE, SODIUM LACTATE, POTASSIUM CHLORIDE, CALCIUM CHLORIDE 600; 310; 30; 20 MG/100ML; MG/100ML; MG/100ML; MG/100ML
INJECTION, SOLUTION INTRAVENOUS ONCE
Status: DISCONTINUED | OUTPATIENT
Start: 2024-04-09 | End: 2024-04-09

## 2024-04-09 RX ORDER — BISACODYL 10 MG
10 SUPPOSITORY, RECTAL RECTAL PRN
Status: DISCONTINUED | OUTPATIENT
Start: 2024-04-09 | End: 2024-04-12 | Stop reason: HOSPADM

## 2024-04-09 RX ORDER — HYDROMORPHONE HYDROCHLORIDE 1 MG/ML
0.4 INJECTION, SOLUTION INTRAMUSCULAR; INTRAVENOUS; SUBCUTANEOUS
Status: DISCONTINUED | OUTPATIENT
Start: 2024-04-09 | End: 2024-04-09 | Stop reason: HOSPADM

## 2024-04-09 RX ORDER — SODIUM CHLORIDE, SODIUM GLUCONATE, SODIUM ACETATE, POTASSIUM CHLORIDE AND MAGNESIUM CHLORIDE 526; 502; 368; 37; 30 MG/100ML; MG/100ML; MG/100ML; MG/100ML; MG/100ML
INJECTION, SOLUTION INTRAVENOUS CONTINUOUS
Status: DISCONTINUED | OUTPATIENT
Start: 2024-04-09 | End: 2024-04-09

## 2024-04-09 RX ORDER — METHYLERGONOVINE MALEATE 0.2 MG/ML
0.2 INJECTION INTRAVENOUS
Status: DISCONTINUED | OUTPATIENT
Start: 2024-04-09 | End: 2024-04-12 | Stop reason: HOSPADM

## 2024-04-09 RX ORDER — MORPHINE SULFATE 0.5 MG/ML
INJECTION, SOLUTION EPIDURAL; INTRATHECAL; INTRAVENOUS
Status: COMPLETED | OUTPATIENT
Start: 2024-04-09 | End: 2024-04-09

## 2024-04-09 RX ORDER — KETOROLAC TROMETHAMINE 15 MG/ML
INJECTION, SOLUTION INTRAMUSCULAR; INTRAVENOUS PRN
Status: DISCONTINUED | OUTPATIENT
Start: 2024-04-09 | End: 2024-04-09 | Stop reason: SURG

## 2024-04-09 RX ORDER — IBUPROFEN 800 MG/1
800 TABLET ORAL EVERY 8 HOURS PRN
Status: DISCONTINUED | OUTPATIENT
Start: 2024-04-13 | End: 2024-04-12 | Stop reason: HOSPADM

## 2024-04-09 RX ORDER — DIPHENHYDRAMINE HYDROCHLORIDE 50 MG/ML
12.5 INJECTION INTRAMUSCULAR; INTRAVENOUS EVERY 6 HOURS PRN
Status: ACTIVE | OUTPATIENT
Start: 2024-04-09 | End: 2024-04-10

## 2024-04-09 RX ORDER — DIPHENHYDRAMINE HYDROCHLORIDE 50 MG/ML
25 INJECTION INTRAMUSCULAR; INTRAVENOUS EVERY 6 HOURS PRN
Status: DISCONTINUED | OUTPATIENT
Start: 2024-04-10 | End: 2024-04-12 | Stop reason: HOSPADM

## 2024-04-09 RX ORDER — OXYCODONE HCL 5 MG/5 ML
10 SOLUTION, ORAL ORAL
Status: DISCONTINUED | OUTPATIENT
Start: 2024-04-09 | End: 2024-04-09 | Stop reason: HOSPADM

## 2024-04-09 RX ORDER — HYDROMORPHONE HYDROCHLORIDE 1 MG/ML
0.4 INJECTION, SOLUTION INTRAMUSCULAR; INTRAVENOUS; SUBCUTANEOUS
Status: ACTIVE | OUTPATIENT
Start: 2024-04-09 | End: 2024-04-10

## 2024-04-09 RX ORDER — OXYTOCIN 10 [USP'U]/ML
10 INJECTION, SOLUTION INTRAMUSCULAR; INTRAVENOUS
Status: DISCONTINUED | OUTPATIENT
Start: 2024-04-09 | End: 2024-04-09

## 2024-04-09 RX ORDER — VITAMIN A ACETATE, BETA CAROTENE, ASCORBIC ACID, CHOLECALCIFEROL, .ALPHA.-TOCOPHEROL ACETATE, DL-, THIAMINE MONONITRATE, RIBOFLAVIN, NIACINAMIDE, PYRIDOXINE HYDROCHLORIDE, FOLIC ACID, CYANOCOBALAMIN, CALCIUM CARBONATE, FERROUS FUMARATE, ZINC OXIDE, CUPRIC OXIDE 3080; 12; 120; 400; 1; 1.84; 3; 20; 22; 920; 25; 200; 27; 10; 2 [IU]/1; UG/1; MG/1; [IU]/1; MG/1; MG/1; MG/1; MG/1; MG/1; [IU]/1; MG/1; MG/1; MG/1; MG/1; MG/1
1 TABLET, FILM COATED ORAL
Status: DISCONTINUED | OUTPATIENT
Start: 2024-04-09 | End: 2024-04-12 | Stop reason: HOSPADM

## 2024-04-09 RX ORDER — ONDANSETRON 2 MG/ML
INJECTION INTRAMUSCULAR; INTRAVENOUS PRN
Status: DISCONTINUED | OUTPATIENT
Start: 2024-04-09 | End: 2024-04-09 | Stop reason: SURG

## 2024-04-09 RX ORDER — IBUPROFEN 800 MG/1
800 TABLET ORAL EVERY 8 HOURS
Qty: 9 TABLET | Refills: 0 | Status: DISCONTINUED | OUTPATIENT
Start: 2024-04-10 | End: 2024-04-12 | Stop reason: HOSPADM

## 2024-04-09 RX ORDER — MEPERIDINE HYDROCHLORIDE 25 MG/ML
12.5 INJECTION INTRAMUSCULAR; INTRAVENOUS; SUBCUTANEOUS
Status: DISCONTINUED | OUTPATIENT
Start: 2024-04-09 | End: 2024-04-09 | Stop reason: HOSPADM

## 2024-04-09 RX ORDER — DIPHENHYDRAMINE HYDROCHLORIDE 50 MG/ML
12.5 INJECTION INTRAMUSCULAR; INTRAVENOUS
Status: DISCONTINUED | OUTPATIENT
Start: 2024-04-09 | End: 2024-04-09 | Stop reason: HOSPADM

## 2024-04-09 RX ORDER — DEXAMETHASONE SODIUM PHOSPHATE 4 MG/ML
INJECTION, SOLUTION INTRA-ARTICULAR; INTRALESIONAL; INTRAMUSCULAR; INTRAVENOUS; SOFT TISSUE PRN
Status: DISCONTINUED | OUTPATIENT
Start: 2024-04-09 | End: 2024-04-09 | Stop reason: SURG

## 2024-04-09 RX ORDER — ACETAMINOPHEN 500 MG
1000 TABLET ORAL EVERY 6 HOURS
Qty: 24 TABLET | Refills: 0 | Status: DISCONTINUED | OUTPATIENT
Start: 2024-04-10 | End: 2024-04-12 | Stop reason: HOSPADM

## 2024-04-09 RX ORDER — EPHEDRINE SULFATE 50 MG/ML
10 INJECTION, SOLUTION INTRAVENOUS
Status: ACTIVE | OUTPATIENT
Start: 2024-04-09 | End: 2024-04-10

## 2024-04-09 RX ORDER — SODIUM CHLORIDE, SODIUM LACTATE, POTASSIUM CHLORIDE, CALCIUM CHLORIDE 600; 310; 30; 20 MG/100ML; MG/100ML; MG/100ML; MG/100ML
INJECTION, SOLUTION INTRAVENOUS PRN
Status: DISCONTINUED | OUTPATIENT
Start: 2024-04-09 | End: 2024-04-12 | Stop reason: HOSPADM

## 2024-04-09 RX ORDER — CALCIUM CARBONATE 500 MG/1
1000 TABLET, CHEWABLE ORAL EVERY 6 HOURS PRN
Status: DISCONTINUED | OUTPATIENT
Start: 2024-04-09 | End: 2024-04-12 | Stop reason: HOSPADM

## 2024-04-09 RX ORDER — HYDRALAZINE HYDROCHLORIDE 20 MG/ML
5 INJECTION INTRAMUSCULAR; INTRAVENOUS
Status: DISCONTINUED | OUTPATIENT
Start: 2024-04-09 | End: 2024-04-09 | Stop reason: HOSPADM

## 2024-04-09 RX ORDER — DOCUSATE SODIUM 100 MG/1
100 CAPSULE, LIQUID FILLED ORAL 2 TIMES DAILY PRN
Status: DISCONTINUED | OUTPATIENT
Start: 2024-04-09 | End: 2024-04-12 | Stop reason: HOSPADM

## 2024-04-09 RX ORDER — LABETALOL HYDROCHLORIDE 5 MG/ML
5 INJECTION, SOLUTION INTRAVENOUS
Status: DISCONTINUED | OUTPATIENT
Start: 2024-04-09 | End: 2024-04-09 | Stop reason: HOSPADM

## 2024-04-09 RX ORDER — MISOPROSTOL 200 UG/1
800 TABLET ORAL
Status: DISCONTINUED | OUTPATIENT
Start: 2024-04-09 | End: 2024-04-12 | Stop reason: HOSPADM

## 2024-04-09 RX ORDER — ONDANSETRON 2 MG/ML
4 INJECTION INTRAMUSCULAR; INTRAVENOUS EVERY 6 HOURS PRN
Status: DISCONTINUED | OUTPATIENT
Start: 2024-04-10 | End: 2024-04-12 | Stop reason: HOSPADM

## 2024-04-09 RX ORDER — METOPROLOL TARTRATE 1 MG/ML
1 INJECTION, SOLUTION INTRAVENOUS
Status: DISCONTINUED | OUTPATIENT
Start: 2024-04-09 | End: 2024-04-09 | Stop reason: HOSPADM

## 2024-04-09 RX ORDER — ONDANSETRON 4 MG/1
4 TABLET, ORALLY DISINTEGRATING ORAL EVERY 6 HOURS PRN
Status: DISCONTINUED | OUTPATIENT
Start: 2024-04-10 | End: 2024-04-12 | Stop reason: HOSPADM

## 2024-04-09 RX ORDER — IPRATROPIUM BROMIDE AND ALBUTEROL SULFATE 2.5; .5 MG/3ML; MG/3ML
3 SOLUTION RESPIRATORY (INHALATION)
Status: DISCONTINUED | OUTPATIENT
Start: 2024-04-09 | End: 2024-04-09 | Stop reason: HOSPADM

## 2024-04-09 RX ORDER — OXYCODONE HYDROCHLORIDE 5 MG/1
5 TABLET ORAL EVERY 4 HOURS PRN
Status: DISCONTINUED | OUTPATIENT
Start: 2024-04-10 | End: 2024-04-12 | Stop reason: HOSPADM

## 2024-04-09 RX ORDER — PHENYLEPHRINE HCL IN 0.9% NACL 0.5 MG/5ML
SYRINGE (ML) INTRAVENOUS PRN
Status: DISCONTINUED | OUTPATIENT
Start: 2024-04-09 | End: 2024-04-09 | Stop reason: SURG

## 2024-04-09 RX ORDER — ONDANSETRON 2 MG/ML
4 INJECTION INTRAMUSCULAR; INTRAVENOUS
Status: DISCONTINUED | OUTPATIENT
Start: 2024-04-09 | End: 2024-04-09 | Stop reason: HOSPADM

## 2024-04-09 RX ORDER — HYDROMORPHONE HYDROCHLORIDE 1 MG/ML
0.2 INJECTION, SOLUTION INTRAMUSCULAR; INTRAVENOUS; SUBCUTANEOUS
Status: DISCONTINUED | OUTPATIENT
Start: 2024-04-09 | End: 2024-04-09 | Stop reason: HOSPADM

## 2024-04-09 RX ORDER — SIMETHICONE 125 MG
125 TABLET,CHEWABLE ORAL 4 TIMES DAILY PRN
Status: DISCONTINUED | OUTPATIENT
Start: 2024-04-09 | End: 2024-04-12 | Stop reason: HOSPADM

## 2024-04-09 RX ORDER — OXYCODONE HYDROCHLORIDE 5 MG/1
5 TABLET ORAL EVERY 4 HOURS PRN
Status: ACTIVE | OUTPATIENT
Start: 2024-04-09 | End: 2024-04-10

## 2024-04-09 RX ORDER — OXYCODONE HYDROCHLORIDE 5 MG/1
10 TABLET ORAL EVERY 4 HOURS PRN
Status: DISPENSED | OUTPATIENT
Start: 2024-04-09 | End: 2024-04-10

## 2024-04-09 RX ORDER — ACETAMINOPHEN 500 MG
1000 TABLET ORAL EVERY 6 HOURS PRN
Status: DISCONTINUED | OUTPATIENT
Start: 2024-04-13 | End: 2024-04-12 | Stop reason: HOSPADM

## 2024-04-09 RX ORDER — BUPIVACAINE HYDROCHLORIDE 7.5 MG/ML
INJECTION, SOLUTION INTRASPINAL
Status: COMPLETED | OUTPATIENT
Start: 2024-04-09 | End: 2024-04-09

## 2024-04-09 RX ORDER — OXYCODONE HYDROCHLORIDE 5 MG/1
10 TABLET ORAL EVERY 4 HOURS PRN
Status: DISCONTINUED | OUTPATIENT
Start: 2024-04-10 | End: 2024-04-12 | Stop reason: HOSPADM

## 2024-04-09 RX ORDER — ACETAMINOPHEN 500 MG
1000 TABLET ORAL EVERY 6 HOURS
Status: DISPENSED | OUTPATIENT
Start: 2024-04-09 | End: 2024-04-10

## 2024-04-09 RX ADMIN — SODIUM CHLORIDE, SODIUM GLUCONATE, SODIUM ACETATE, POTASSIUM CHLORIDE AND MAGNESIUM CHLORIDE: 526; 502; 368; 37; 30 INJECTION, SOLUTION INTRAVENOUS at 00:33

## 2024-04-09 RX ADMIN — KETOROLAC TROMETHAMINE 15 MG: 15 INJECTION, SOLUTION INTRAMUSCULAR; INTRAVENOUS at 08:00

## 2024-04-09 RX ADMIN — ONDANSETRON 4 MG: 2 INJECTION INTRAMUSCULAR; INTRAVENOUS at 01:17

## 2024-04-09 RX ADMIN — CEFAZOLIN 3 G: 1 INJECTION, POWDER, FOR SOLUTION INTRAMUSCULAR; INTRAVENOUS at 01:04

## 2024-04-09 RX ADMIN — Medication 200 MCG: at 01:17

## 2024-04-09 RX ADMIN — ACETAMINOPHEN 1000 MG: 500 TABLET, FILM COATED ORAL at 17:21

## 2024-04-09 RX ADMIN — KETOROLAC TROMETHAMINE 15 MG: 15 INJECTION, SOLUTION INTRAMUSCULAR; INTRAVENOUS at 20:21

## 2024-04-09 RX ADMIN — OXYCODONE 10 MG: 5 TABLET ORAL at 20:20

## 2024-04-09 RX ADMIN — MEASLES, MUMPS, AND RUBELLA VIRUS VACCINE LIVE 0.5 ML: 1000; 12500; 1000 INJECTION, POWDER, LYOPHILIZED, FOR SUSPENSION SUBCUTANEOUS at 11:13

## 2024-04-09 RX ADMIN — ACETAMINOPHEN 1000 MG: 500 TABLET, FILM COATED ORAL at 11:12

## 2024-04-09 RX ADMIN — DEXAMETHASONE SODIUM PHOSPHATE 8 MG: 4 INJECTION INTRA-ARTICULAR; INTRALESIONAL; INTRAMUSCULAR; INTRAVENOUS; SOFT TISSUE at 01:42

## 2024-04-09 RX ADMIN — BUPIVACAINE HYDROCHLORIDE IN DEXTROSE 1.6 ML: 7.5 INJECTION, SOLUTION SUBARACHNOID at 01:01

## 2024-04-09 RX ADMIN — KETOROLAC TROMETHAMINE 15 MG: 15 INJECTION, SOLUTION INTRAMUSCULAR; INTRAVENOUS at 13:34

## 2024-04-09 RX ADMIN — OXYTOCIN 125 ML/HR: 10 INJECTION, SOLUTION INTRAMUSCULAR; INTRAVENOUS at 02:56

## 2024-04-09 RX ADMIN — FAMOTIDINE 20 MG: 10 INJECTION, SOLUTION INTRAVENOUS at 00:27

## 2024-04-09 RX ADMIN — OXYTOCIN 1000 ML: 10 INJECTION, SOLUTION INTRAMUSCULAR; INTRAVENOUS at 01:21

## 2024-04-09 RX ADMIN — KETOROLAC TROMETHAMINE 15 MG: 15 INJECTION, SOLUTION INTRAMUSCULAR; INTRAVENOUS at 01:42

## 2024-04-09 RX ADMIN — METOCLOPRAMIDE 10 MG: 5 INJECTION, SOLUTION INTRAMUSCULAR; INTRAVENOUS at 00:26

## 2024-04-09 RX ADMIN — Medication 200 MCG: at 01:08

## 2024-04-09 RX ADMIN — OXYTOCIN 125 ML/HR: 10 INJECTION, SOLUTION INTRAMUSCULAR; INTRAVENOUS at 04:20

## 2024-04-09 RX ADMIN — SODIUM CITRATE AND CITRIC ACID MONOHYDRATE 30 ML: 334; 500 SOLUTION ORAL at 00:26

## 2024-04-09 RX ADMIN — FENTANYL CITRATE 15 MCG: 50 INJECTION, SOLUTION INTRAMUSCULAR; INTRAVENOUS at 01:01

## 2024-04-09 RX ADMIN — ACETAMINOPHEN 1000 MG: 500 TABLET, FILM COATED ORAL at 06:09

## 2024-04-09 RX ADMIN — MORPHINE SULFATE 200 MCG: 0.5 INJECTION, SOLUTION EPIDURAL; INTRATHECAL; INTRAVENOUS at 01:01

## 2024-04-09 RX ADMIN — PRENATAL WITH FERROUS FUM AND FOLIC ACID 1 TABLET: 3080; 920; 120; 400; 22; 1.84; 3; 20; 10; 1; 12; 200; 27; 25; 2 TABLET ORAL at 08:00

## 2024-04-09 RX ADMIN — DOCUSATE SODIUM 100 MG: 100 CAPSULE, LIQUID FILLED ORAL at 20:20

## 2024-04-09 ASSESSMENT — PAIN DESCRIPTION - PAIN TYPE
TYPE: ACUTE PAIN;SURGICAL PAIN
TYPE: SURGICAL PAIN
TYPE: ACUTE PAIN;SURGICAL PAIN
TYPE: SURGICAL PAIN
TYPE: SURGICAL PAIN
TYPE: ACUTE PAIN;SURGICAL PAIN
TYPE: SURGICAL PAIN
TYPE: ACUTE PAIN;SURGICAL PAIN

## 2024-04-09 NOTE — DISCHARGE PLANNING
Discharge Planning Assessment Post Partum    Reason for Referral: History of meth, bipolar, anxiety, and syphilis   Address: [address] [place] [address] [address]  Phone: [phone]  Type of Living Situation: [facility]-has been in program since 3/12/24  Mom Diagnosis: Pregnancy,    Baby Diagnosis: -39 weeks   Primary Language: English     Name of Baby: Rayne Paredes (: 24)  Father of the Baby: Genaro Paredes  Involved in baby’s care? Yes  Contact Information:  No phone     Prenatal Care: Yes-Dr. Fontanez   Mom's PCP: No PCP listed   PCP for new baby: Pediatrician list provided     Support System: FOB and sister-in-law: Sony Paredes   Coping/Bonding between mother & baby: Yes  Source of Feeding: formula   Supplies for Infant: prepared-states she has a car seat, bassinet, pack-n-play, clothes, and diapers    Mom's Insurance: [employer]   Baby Covered on Insurance:Yes  Mother Employed/School: Not currently  Other children in the home/names & ages: three children; ages 15, 4, and 2.  The children live with her at [facility], but are currently staying with her sister-in-law while she is in the hospital to give birth    Financial Hardship/Income: No  Mom's Mental status: alert and oriented  Services used prior to admit: Medicaid, WIC, and pending TANF    CPS History: No  Psychiatric History: history of anxiety, bipolar, and ADHD.  MOB is seeing a Psychiatrist at [facility] and is taking Zoloft and Abilify  Domestic Violence History: No  Drug/ETOH History: history of meth use.  Last use was 2024.  Infant's UDS is negative     Resources Provided: pediatrician list, children and family resource list, post partum support and counseling resources, diaper bank assistance resources, and baby bundle of  supplies   Referrals Made: diaper bank referral provided      Clearance for Discharge: Infant is cleared to discharge home  with mother once medically cleared

## 2024-04-09 NOTE — ANESTHESIA TIME REPORT
Anesthesia Start and Stop Event Times       Date Time Event    4/8/2024 2253 Ready for Procedure    4/9/2024 0033 Anesthesia Start     0201 Anesthesia Stop          Responsible Staff  04/09/24      Name Role Begin End    Quincy Barger M.D. Anesth 0033 0201          Overtime Reason:  no overtime (within assigned shift)    Comments:

## 2024-04-09 NOTE — CARE PLAN
The patient is Stable - Low risk of patient condition declining or worsening    Shift Goals  Clinical Goals: VSS, BF, pain management  Patient Goals: rooming in, bonding  Family Goals: support, bonding    Progress made toward(s) clinical / shift goals:    Problem: Knowledge Deficit - L&D  Goal: Patient and family/caregivers will demonstrate understanding of plan of care, disease process/condition, diagnostic tests and medications  Outcome: Progressing     Problem: Risk for Excess Fluid Volume  Goal: Patient will demonstrate pulse, blood pressure and neurologic signs within expected ranges and without any respiratory complications  Outcome: Progressing     Problem: Pain - Standard  Goal: Alleviation of pain or a reduction in pain to the patient’s comfort goal  Outcome: Progressing     Problem: Knowledge Deficit - Standard  Goal: Patient and family/care givers will demonstrate understanding of plan of care, disease process/condition, diagnostic tests and medications  Outcome: Progressing       Patient is not progressing towards the following goals:

## 2024-04-09 NOTE — ANESTHESIA PREPROCEDURE EVALUATION
Case: [id number] Date: 24    Procedure: REPEAT  SECTION    Pre-op diagnosis: SUPERVISION OF HIGH RISK PREGNANCY WITH HISTORY OF PREVIOUS  SECTION-39+2 WEEKS    Location: [facility]    Surgeons: Carrie Fontanez M.D.            Relevant Problems   PULMONARY   (positive) Mild intermittent asthma without complication       Physical Exam    Airway   Mallampati: III  TM distance: >3 FB  Neck ROM: full       Cardiovascular - normal exam  Rhythm: regular  Rate: normal  (-) murmur     Dental - normal exam           Pulmonary - normal exam  Breath sounds clear to auscultation     Abdominal   (+) obese     Neurological - normal exam                   Anesthesia Plan    ASA 3- EMERGENT   ASA physical status 3 criteria: morbid obesity - BMI greater than or equal to 40ASA physical status emergent criteria: compromised vital organ, limb or tissue    Plan - spinal   Neuraxial block will be primary anesthetic                Postoperative Plan: Postoperative administration of opioids is intended.    Pertinent diagnostic labs and testing reviewed    Informed Consent:    Anesthetic plan and risks discussed with patient.    Use of blood products discussed with: patient whom.

## 2024-04-09 NOTE — ANESTHESIA POSTPROCEDURE EVALUATION
Patient: Rhonda Lopez    Procedure Summary       Date: 24 Room / Location: [facility]    Anesthesia Start: 33 Anesthesia Stop: 020    Procedure: REPEAT  SECTION (Abdomen) Diagnosis:       S/P repeat low transverse       (SUPERVISION OF HIGH RISK PREGNANCY WITH HISTORY OF PREVIOUS  SECTION-39+2 WEEKS)    Surgeons: Carrie Fontanez M.D. Responsible Provider: Quincy Barger M.D.    Anesthesia Type: spinal ASA Status: 3 - Emergent            Final Anesthesia Type: spinal  Last vitals  BP   Blood Pressure: 130/71    Temp        Pulse   74   Resp        SpO2   99 %      Anesthesia Post Evaluation    Patient location during evaluation: PACU  Patient participation: complete - patient participated  Level of consciousness: awake and alert    Airway patency: patent  Anesthetic complications: no  Cardiovascular status: hemodynamically stable  Respiratory status: acceptable  Hydration status: euvolemic    PONV: none    patient able to participate, but full recovery from regional anesthesia has not occurred and is not expected within the stipulated timeframe for the completion of the evaluation      There were no known notable events for this encounter.     Nurse Pain Score: 3 (NPRS)
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
## 2024-04-09 NOTE — PROGRESS NOTES
0800: Assumed care of patient, assessment completed. Fundus is firm at umbilicus with scant lochia rubra. Patient reports 7/10 pain at this time, scheduled medication given see MAR. Patient ambulated to restroom with stand by assist, tolerated well. Plan of care discussed, patient verbalized understanding.

## 2024-04-09 NOTE — CARE PLAN
The patient is Stable - Low risk of patient condition declining or worsening    Shift Goals  Clinical Goals: patient will remain clinically stable  Patient Goals:   Family Goals:     Progress made toward(s) clinical / shift goals:      Problem: Pain - Standard  Goal: Alleviation of pain or a reduction in pain to the patient’s comfort goal  Outcome: Progressing     Problem: Altered Physiologic Condition  Goal: Patient physiologically stable as evidenced by normal lochia, palpable uterine involution and vitals within normal limits  Outcome: Progressing     Patient is able to verbalize pain and request for pain management if needed. Patient is receiving scheduled medication for management. Fundus is firm at one below umbilicus with scant lochia rubra. Patient able to ambulate, UO adequate.     Patient is not progressing towards the following goals:

## 2024-04-09 NOTE — PROGRESS NOTES
2220: Received report from Adele HOWARD. Pt given consents to sign.     2245: Denita SIMPSON in room to consent pt.     2256: Pt C/S placed on hold. Pt is made aware.     0119: Delivery of viable baby boy    0355: Pt out of PACU    0410: Gave report to Marcelo LONG RN. POC discussed.

## 2024-04-09 NOTE — PROGRESS NOTES
Patient is a  at 38weeks and 6days, EDC of . Arrived to unit via [facility] and placed in LDA 3, RN placed EFM and toco to ensure fetal well being and monitor uterine activity. RN educated patient on need for monitors and patient verbalized understanding. Vital signs taken. Available prenatal labs / records reviewed by RN. Patient's chief complaint contractions started at 1300 getting stronger and longer.  Patient denies leaking or vaginal bleeding, reports positive fetal movement.    SVE 1cm, patient stated she was closed in the office today.    Dr. Fontanez notified of patient arrival, report given with an MFTI,  & para reviewed, bps, fhr, contraction pattern reviewed, patient desires to another cs without sterilization.   0 Bedside report given to Jimi HOWARD. Plan of care discussed. Care Relinquished.   5 IV started. Labs sent.

## 2024-04-09 NOTE — ANESTHESIA PROCEDURE NOTES
Spinal Block    Date/Time: 4/9/2024 12:50 PM    Performed by: Quincy Barger M.D.  Authorized by: Quincy Barger M.D.    Start Time:  4/9/2024 12:50 PM  End Time:  4/9/2024 1:01 AM  Reason for Block: primary anesthetic    patient identified, IV checked, site marked, risks and benefits discussed, surgical consent, monitors and equipment checked, pre-op evaluation and timeout performed    Patient Position:  Sitting  Prep: ChloraPrep, patient draped and sterile technique    Monitoring:  Blood pressure, continuous pulse oximetry and heart rate  Approach:  Midline  Location:  L4-5  Injection Technique:  Single-shot  Skin infiltration:  Lidocaine  Strength:  1%  Dose:  3ml  Needle Type:  Pencan  Needle Gauge:  25 G  CSF flowing pre/post injection:  Yes  Sensory Level:  T4   SAB difficult because of morbid obesity.  Epidural/spinal technique used.

## 2024-04-09 NOTE — H&P
History and Physical      Rhonda Lopez is a 33 y.o. female  at 38w6d who presents for CTXs. Denies LOF or VB. Good FM.    ROS: Pertinent items are noted in HPI.    Past Medical History:   Diagnosis Date    Asthma     inhaler    Psychiatric problem     mood swings,  ADHD     Past Surgical History:   Procedure Laterality Date    REPEAT C SECTION Bilateral 2021    Procedure:  SECTION, REPEAT;  Surgeon: Bonnie Ghotra M.D.;  Location: [facility];  Service: Labor and Delivery    REPEAT C SECTION  2019    Procedure:  SECTION, REPEAT;  Surgeon: Adam Ortiz M.D.;  Location: LABOR AND DELIVERY;  Service: Labor and Delivery    PRIMARY C SECTION  2008    done at [facility]    OTHER      [id number]     No family history on file.  OB History    Para Term  AB Living   5 3 3   1 3   SAB IAB Ectopic Molar Multiple Live Births   1       0 3      # Outcome Date GA Lbr Aubrey/2nd Weight Sex Delivery Anes PTL Lv   5 Current            4 Term 21 39w3d  3.54 kg (7 lb 12.9 oz) M CS-LTranv Spinal N DELANEY   3 Term 19 39w4d  3.52 kg (7 lb 12.2 oz) M CS-LTranv Spinal N DELANEY   2 Term 08 40w0d  2.92 kg (6 lb 7 oz) F CS-LTranv Spinal N DELANEY      Birth Comments: C/Section due to unable to progress   1 SAB 10/27/07 4w0d    SAB         Birth Comments: PAssed on its own     Social History     Tobacco Use    Smoking status: Former     Current packs/day: 0.25     Average packs/day: 0.3 packs/day for 10.0 years (2.5 ttl pk-yrs)     Types: Cigarettes    Smokeless tobacco: Never    Tobacco comments:     quit in December   Vaping Use    Vaping Use: Never used   Substance Use Topics    Alcohol use: No    Drug use: Not Currently     Types: Methamphetamines     Comment: last used methamphetamines 10/30/2018     Allergies: Patient has no known allergies.    Prenatal care with Fontanez starting at 3rd trimester with following problems:  Drugs of Abuse  Prior  "C/S x 3  Syphilis  Limited care  GBS positive    Objective:    Ht 1.676 m (5' 6\")   Wt 120 kg (264 lb)     General:   no acute distress, alert, cooperative, moderately obese   Skin:   normal   HEENT:  Neck supple with midline trachea   Lungs:   CTA bilateral   Heart:   regular rate and rhythm   Abdomen:   gravid, NT   EFW:  7 lbs   Pelvis:  adequate with gynecoid pelvis   FHT:  Reactive NST   Uterine Size: S=D   Presentations: Cephalic   Cervix:     Dilation: 1cm    Effacement: 75%    Station:  -2    Consistency: Soft    Position: Middle     Lab Review  No results found for this or any previous visit (from the past 5880 hour(s)).     Assessment:   Rhonda Lopez at 38w6d  Labor status: Early latent labor.  Prior C/S x 3 with cervical change   Plan:   Admit to L&D  GBS positive  Discussed with the patient indications for  delivery. The patient voiced understanding of indications for  section at this time.    Discussed with the patient the risks of  delivery. The risks include infection, bleeding, scarring, damage to other organs in the area of operation. Specifically organs that can be damaged are bowel, bladder, ureters. I also discussed with the patient the risk of wound infection and wound breakdown. We discussed that these risks are greater in people that have diabetes or obesity. I also discussed the risk of emergency blood transfusion during procedure as well as emergency hysterectomy during procedure.    Patient had the opportunity to ask questions regarding procedures. All questions answered to the patient's satisfaction.    Proceed with  delivey               "

## 2024-04-10 LAB — T PALLIDUM AB SER QL AGGL: REACTIVE

## 2024-04-10 PROCEDURE — 700111 HCHG RX REV CODE 636 W/ 250 OVERRIDE (IP): Performed by: ANESTHESIOLOGY

## 2024-04-10 PROCEDURE — A9270 NON-COVERED ITEM OR SERVICE: HCPCS | Performed by: OBSTETRICS & GYNECOLOGY

## 2024-04-10 PROCEDURE — 770002 HCHG ROOM/CARE - OB PRIVATE (112)

## 2024-04-10 PROCEDURE — 700102 HCHG RX REV CODE 250 W/ 637 OVERRIDE(OP): Performed by: OBSTETRICS & GYNECOLOGY

## 2024-04-10 RX ADMIN — OXYCODONE 10 MG: 5 TABLET ORAL at 23:40

## 2024-04-10 RX ADMIN — KETOROLAC TROMETHAMINE 15 MG: 15 INJECTION, SOLUTION INTRAMUSCULAR; INTRAVENOUS at 02:54

## 2024-04-10 RX ADMIN — ACETAMINOPHEN 1000 MG: 500 TABLET, FILM COATED ORAL at 17:51

## 2024-04-10 RX ADMIN — ACETAMINOPHEN 1000 MG: 500 TABLET, FILM COATED ORAL at 11:53

## 2024-04-10 RX ADMIN — ACETAMINOPHEN 1000 MG: 500 TABLET, FILM COATED ORAL at 23:36

## 2024-04-10 RX ADMIN — PRENATAL WITH FERROUS FUM AND FOLIC ACID 1 TABLET: 3080; 920; 120; 400; 22; 1.84; 3; 20; 10; 1; 12; 200; 27; 25; 2 TABLET ORAL at 08:00

## 2024-04-10 RX ADMIN — ACETAMINOPHEN 1000 MG: 500 TABLET, FILM COATED ORAL at 05:29

## 2024-04-10 RX ADMIN — OXYCODONE 10 MG: 5 TABLET ORAL at 10:35

## 2024-04-10 RX ADMIN — IBUPROFEN 800 MG: 800 TABLET, FILM COATED ORAL at 08:00

## 2024-04-10 RX ADMIN — OXYCODONE 10 MG: 5 TABLET ORAL at 14:32

## 2024-04-10 RX ADMIN — IBUPROFEN 800 MG: 800 TABLET, FILM COATED ORAL at 23:36

## 2024-04-10 RX ADMIN — IBUPROFEN 800 MG: 800 TABLET, FILM COATED ORAL at 15:49

## 2024-04-10 RX ADMIN — ACETAMINOPHEN 1000 MG: 500 TABLET, FILM COATED ORAL at 00:15

## 2024-04-10 ASSESSMENT — PAIN DESCRIPTION - PAIN TYPE
TYPE: SURGICAL PAIN
TYPE: ACUTE PAIN;SURGICAL PAIN
TYPE: SURGICAL PAIN
TYPE: ACUTE PAIN;SURGICAL PAIN
TYPE: SURGICAL PAIN
TYPE: SURGICAL PAIN

## 2024-04-10 ASSESSMENT — EDINBURGH POSTNATAL DEPRESSION SCALE (EPDS)
I HAVE BLAMED MYSELF UNNECESSARILY WHEN THINGS WENT WRONG: NO, NEVER
THINGS HAVE BEEN GETTING ON TOP OF ME: NO, MOST OF THE TIME I HAVE COPED QUITE WELL
I HAVE LOOKED FORWARD WITH ENJOYMENT TO THINGS: AS MUCH AS I EVER DID
THE THOUGHT OF HARMING MYSELF HAS OCCURRED TO ME: NEVER
I HAVE BEEN ANXIOUS OR WORRIED FOR NO GOOD REASON: NO, NOT AT ALL
I HAVE FELT SCARED OR PANICKY FOR NO GOOD REASON: NO, NOT AT ALL
I HAVE BEEN ABLE TO LAUGH AND SEE THE FUNNY SIDE OF THINGS: AS MUCH AS I ALWAYS COULD
I HAVE BEEN SO UNHAPPY THAT I HAVE BEEN CRYING: NO, NEVER
I HAVE BEEN SO UNHAPPY THAT I HAVE HAD DIFFICULTY SLEEPING: NOT AT ALL
I HAVE FELT SAD OR MISERABLE: NO, NOT AT ALL

## 2024-04-10 NOTE — CARE PLAN
The patient is Stable - Low risk of patient condition declining or worsening    Shift Goals  Clinical Goals: VSS, pain WDL  Patient Goals: rooming in, bonding  Family Goals: support, bonding    Progress made toward(s) clinical / shift goals:  VSS, pain controlled with scheduled and PRN medications, encouraged to rest in between infant feeds. Encouraged to call with any needs.       Problem: Knowledge Deficit - Postpartum  Goal: Patient will verbalize and demonstrate understanding of self and infant care  Outcome: Progressing     Problem: Altered Physiologic Condition  Goal: Patient physiologically stable as evidenced by normal lochia, palpable uterine involution and vitals within normal limits  Outcome: Progressing     Problem: Early Mobilization - Post Surgery  Goal: Early mobilization post surgery  Outcome: Progressing

## 2024-04-10 NOTE — PROGRESS NOTES
Patient assessment completed. Discussed pain management plan and patient requests pain medication be provided as scheduled and will call for breakthrough pain medication as needed. Patient denies dizziness and headaches; states she is voiding w/o difficulty and passing flatus. Reviewed plan of care and all questions answered. Pt reports all needs are met at this time.

## 2024-04-10 NOTE — CARE PLAN
The patient is Stable - Low risk of patient condition declining or worsening    Shift Goals  Clinical Goals: VSS; pain management  Patient Goals: rooming in, bonding  Family Goals: support, bonding    Progress made toward(s) clinical / shift goals:      Problem: Early Mobilization - Post Surgery  Goal: Early mobilization post surgery  2024 by Connie Devi R.N.  Outcome: Progressing  NOTE: See below     Problem: Pain - Standard  Goal: Alleviation of pain or a reduction in pain to the patient’s comfort goal  2024 by Connie Devi R.N.  Outcome: Progressing  Discussed pain management and verbalization of pain utilizing the 0-10 pain scale. White board updated with times of pain medication availability and pt requests pain medication be provided as scheduled and will call for medication as needed. Discussed non-pharmacological pain control therapies including splinting with a pillow and light abdominal stretching. Abdominal binder provided per request and education provided on usage. Pt ambulates with a steady gait and demonstrates the ability to participate in ADLs and provide care for .      Problem: Early Mobilization - Post Surgery  Goal: Early mobilization post surgery  2024 by Connie Devi R.N.  Outcome: Progressing  NOTE: Discussed importance of ambulation post-surgery and patient encouraged to walk in the hallways at least four times/shift. Pt able to walk approx 100 ft in hallways without difficulty and with a steady gait.      Problem: Altered Physiologic Condition  Goal: Patient physiologically stable as evidenced by normal lochia, palpable uterine involution and vitals within normal limits  2024 by Connie Deiv R.N.  Outcome: Progressing  NOTE: Patient is physiologically stable as evidenced by scant lochia rubra, firm fundus one fingerbreadth below the umbilicus, and vital signs within defined parameters.      Patient is not progressing towards the following goals: NA

## 2024-04-10 NOTE — PROGRESS NOTES
Assessment completed. Fundus firm, lochia light. VSS. Tolerating diet. Voiding without difficulty, incision dressing CDI. Pt states passing gas. Pain controlled with scheduled medications per MAR. Will offer pain medications as they become available.POC discussed with patient. Encouraged to call with needs, Call light within reach.

## 2024-04-11 PROCEDURE — A9270 NON-COVERED ITEM OR SERVICE: HCPCS | Performed by: OBSTETRICS & GYNECOLOGY

## 2024-04-11 PROCEDURE — 90471 IMMUNIZATION ADMIN: CPT

## 2024-04-11 PROCEDURE — 700102 HCHG RX REV CODE 250 W/ 637 OVERRIDE(OP): Performed by: OBSTETRICS & GYNECOLOGY

## 2024-04-11 PROCEDURE — 3E0234Z INTRODUCTION OF SERUM, TOXOID AND VACCINE INTO MUSCLE, PERCUTANEOUS APPROACH: ICD-10-PCS | Performed by: OBSTETRICS & GYNECOLOGY

## 2024-04-11 PROCEDURE — 90715 TDAP VACCINE 7 YRS/> IM: CPT

## 2024-04-11 PROCEDURE — 700111 HCHG RX REV CODE 636 W/ 250 OVERRIDE (IP)

## 2024-04-11 PROCEDURE — 770002 HCHG ROOM/CARE - OB PRIVATE (112)

## 2024-04-11 RX ORDER — PSEUDOEPHEDRINE HCL 30 MG
100 TABLET ORAL 2 TIMES DAILY PRN
Qty: 60 CAPSULE | Refills: 1 | Status: SHIPPED | OUTPATIENT
Start: 2024-04-11

## 2024-04-11 RX ORDER — OXYCODONE HYDROCHLORIDE AND ACETAMINOPHEN 5; 325 MG/1; MG/1
1 TABLET ORAL EVERY 6 HOURS PRN
Qty: 25 TABLET | Refills: 0 | Status: SHIPPED | OUTPATIENT
Start: 2024-04-11 | End: 2024-04-19

## 2024-04-11 RX ORDER — IBUPROFEN 800 MG/1
800 TABLET ORAL EVERY 8 HOURS PRN
Qty: 30 TABLET | Refills: 1 | Status: SHIPPED | OUTPATIENT
Start: 2024-04-11

## 2024-04-11 RX ADMIN — ACETAMINOPHEN 1000 MG: 500 TABLET, FILM COATED ORAL at 20:48

## 2024-04-11 RX ADMIN — ACETAMINOPHEN 1000 MG: 500 TABLET, FILM COATED ORAL at 08:38

## 2024-04-11 RX ADMIN — IBUPROFEN 800 MG: 800 TABLET, FILM COATED ORAL at 08:38

## 2024-04-11 RX ADMIN — OXYCODONE 10 MG: 5 TABLET ORAL at 11:38

## 2024-04-11 RX ADMIN — ACETAMINOPHEN 1000 MG: 500 TABLET, FILM COATED ORAL at 14:26

## 2024-04-11 RX ADMIN — TETANUS TOXOID, REDUCED DIPHTHERIA TOXOID AND ACELLULAR PERTUSSIS VACCINE, ADSORBED 0.5 ML: 5; 2.5; 8; 8; 2.5 SUSPENSION INTRAMUSCULAR at 20:49

## 2024-04-11 RX ADMIN — OXYCODONE 5 MG: 5 TABLET ORAL at 22:23

## 2024-04-11 RX ADMIN — PRENATAL WITH FERROUS FUM AND FOLIC ACID 1 TABLET: 3080; 920; 120; 400; 22; 1.84; 3; 20; 10; 1; 12; 200; 27; 25; 2 TABLET ORAL at 08:38

## 2024-04-11 RX ADMIN — IBUPROFEN 800 MG: 800 TABLET, FILM COATED ORAL at 16:14

## 2024-04-11 RX ADMIN — DOCUSATE SODIUM 100 MG: 100 CAPSULE, LIQUID FILLED ORAL at 08:38

## 2024-04-11 ASSESSMENT — PAIN DESCRIPTION - PAIN TYPE
TYPE: SURGICAL PAIN
TYPE: ACUTE PAIN;SURGICAL PAIN
TYPE: ACUTE PAIN;SURGICAL PAIN
TYPE: SURGICAL PAIN
TYPE: SURGICAL PAIN
TYPE: ACUTE PAIN;SURGICAL PAIN
TYPE: ACUTE PAIN;SURGICAL PAIN

## 2024-04-11 NOTE — CARE PLAN
The patient is Stable - Low risk of patient condition declining or worsening    Shift Goals  Clinical Goals: VS WDL  Patient Goals: rooming in, bonding  Family Goals: support, bonding    Progress made toward(s) clinical / shift goals:    Problem: Knowledge Deficit - Standard  Goal: Patient and family/care givers will demonstrate understanding of plan of care, disease process/condition, diagnostic tests and medications  Outcome: Progressing     Problem: Knowledge Deficit - Postpartum  Goal: Patient will verbalize and demonstrate understanding of self and infant care  Outcome: Progressing     Problem: Psychosocial - Postpartum  Goal: Patient will verbalize and demonstrate effective bonding and parenting behavior  Outcome: Progressing     Problem: Altered Physiologic Condition  Goal: Patient physiologically stable as evidenced by normal lochia, palpable uterine involution and vitals within normal limits  Outcome: Progressing     Problem: Infection - Postpartum  Goal: Postpartum patient will be free of signs and symptoms of infection  Outcome: Progressing     Problem: Respiratory/Oxygenation Function Post-Surgical  Goal: Patient will achieve/maintain normal respiratory rate/effort  Outcome: Progressing     Problem: Bowel Elimination - Post Surgical  Goal: Patient will resume regular bowel sounds and function with no discomfort or distention  Outcome: Progressing     Problem: Early Mobilization - Post Surgery  Goal: Early mobilization post surgery  Outcome: Progressing       Patient is not progressing towards the following goals:

## 2024-04-11 NOTE — PROGRESS NOTES
Assessment done. Vital signs stable. Patient voiding without difficulty, claims to be passing flatus. Fundus firm with scant lochia. Tegaderm over low abdominal incision clean, dry, and intact. No redness, swelling, or drainage noted. Skin well approximated. Patient ambulating with steady gait. Patient claims to have good pain relief with p.o meds. Bottle feeding infant on demand. Family at bedside. Patient encouraged to call for any needs. Will continue to monitor.

## 2024-04-11 NOTE — PROGRESS NOTES
"Rhonda Lopez PP day 1 POD 1    Subjective: Abdominal pain. yes, ambulating .yes, tolerating liquids .yes, tolerating regular diet .yes, flatus.yes, BM .no, Bleeding .light, voiding .yes,dizziness .no, breast feeding.yes, breast tenderness .no    /72   Pulse 89   Temp 36.6 °C (97.9 °F) (Temporal)   Resp 18   Ht 1.676 m (5' 6\")   Wt 120 kg (264 lb)   SpO2 96%   Breast Exam: Tenderness .no, Engourgement .no, Mastitis .no  Abdomen soft, non-tender. BS normal. No masses,  No organomegaly  Incision: dry and intact  Fundus Tenderness:no, Below umbilicus:Yes, firm  Perineumperineum intact  ExtremitiesNormal    Meds:   No current facility-administered medications on file prior to encounter.     Current Outpatient Medications on File Prior to Encounter   Medication Sig Dispense Refill    ibuprofen (MOTRIN) 600 MG Tab Take 1 Tablet by mouth every 6 hours as needed (For cramping after delivery; do not give if patient is receiving ketorolac (Toradol)). 30 Tablet 0    ARIPiprazole (ABILIFY) 10 MG Tab Take 10 mg by mouth every day.      buPROPion (WELLBUTRIN) 100 MG Tab Take 100 mg by mouth 2 times a day.      albuterol 108 (90 Base) MCG/ACT Aero Soln inhalation aerosol Inhale 2 Puffs every 6 hours as needed for Shortness of Breath.      docusate sodium 100 MG Cap Take 100 mg by mouth 2 times a day as needed for Constipation. 60 Cap 1    NS SOLN 60 mL with albuterol 2.5 mg/0.5 mL NEBU 5 mL 5 mg/hr by Nebulization route.      Prenatal Vit-Fe Fumarate-FA (PRENATAL VITAMINS) 28-0.8 MG Tab Take 1 Tab by mouth every day. 90 Tab 3    cyclobenzaprine (FLEXERIL) 10 MG TABS Take 1 Tab by mouth 3 times a day as needed for Mild Pain. (Patient not taking: Reported on 11/9/2018) 30 Tab 0       Lab:   Recent Results (from the past 48 hour(s))   Hold Blood Bank Specimen (Not Tested)    Collection Time: 04/08/24 10:25 PM   Result Value Ref Range    Holding Tube - Bb DONE    CBC with Differential    Collection Time: 04/08/24 " 10:25 PM   Result Value Ref Range    WBC 8.5 4.8 - 10.8 K/uL    RBC 4.74 4.20 - 5.40 M/uL    Hemoglobin 12.2 12.0 - 16.0 g/dL    Hematocrit 37.0 37.0 - 47.0 %    MCV 78.1 (L) 81.4 - 97.8 fL    MCH 25.7 (L) 27.0 - 33.0 pg    MCHC 33.0 32.2 - 35.5 g/dL    RDW 46.8 35.9 - 50.0 fL    Platelet Count 264 164 - 446 K/uL    MPV 9.4 9.0 - 12.9 fL    Neutrophils-Polys 70.90 44.00 - 72.00 %    Lymphocytes 21.70 (L) 22.00 - 41.00 %    Monocytes 5.80 0.00 - 13.40 %    Eosinophils 0.80 0.00 - 6.90 %    Basophils 0.40 0.00 - 1.80 %    Immature Granulocytes 0.40 0.00 - 0.90 %    Nucleated RBC 0.00 0.00 - 0.20 /100 WBC    Neutrophils (Absolute) 6.00 1.82 - 7.42 K/uL    Lymphs (Absolute) 1.83 1.00 - 4.80 K/uL    Monos (Absolute) 0.49 0.00 - 0.85 K/uL    Eos (Absolute) 0.07 0.00 - 0.51 K/uL    Baso (Absolute) 0.03 0.00 - 0.12 K/uL    Immature Granulocytes (abs) 0.03 0.00 - 0.11 K/uL    NRBC (Absolute) 0.00 K/uL   T.PALLIDUM AB JEN (Syphilis)    Collection Time: 04/08/24 10:25 PM   Result Value Ref Range    Syphilis, Treponemal Qual Reactive (A) Non-Reactive   Comp Metabolic Panel    Collection Time: 04/08/24 10:25 PM   Result Value Ref Range    Sodium 135 135 - 145 mmol/L    Potassium 3.9 3.6 - 5.5 mmol/L    Chloride 105 96 - 112 mmol/L    Co2 17 (L) 20 - 33 mmol/L    Anion Gap 13.0 7.0 - 16.0    Glucose 126 (H) 65 - 99 mg/dL    Bun 9 8 - 22 mg/dL    Creatinine 0.36 (L) 0.50 - 1.40 mg/dL    Calcium 8.7 8.5 - 10.5 mg/dL    Correct Calcium 9.1 8.5 - 10.5 mg/dL    AST(SGOT) 19 12 - 45 U/L    ALT(SGPT) 11 2 - 50 U/L    Alkaline Phosphatase 187 (H) 30 - 99 U/L    Total Bilirubin <0.2 0.1 - 1.5 mg/dL    Albumin 3.5 3.2 - 4.9 g/dL    Total Protein 6.5 6.0 - 8.2 g/dL    Globulin 3.0 1.9 - 3.5 g/dL    A-G Ratio 1.2 g/dL   ESTIMATED GFR    Collection Time: 04/08/24 10:25 PM   Result Value Ref Range    GFR (CKD-EPI) 135 >60 mL/min/1.73 m 2   RPR (SYPHILIS)    Collection Time: 04/08/24 10:25 PM   Result Value Ref Range    Rapid Plasma Reagin  -Rpr- Non Reactive Non Reactive   ANTIBODY,TREPONEMA PALLIDUM    Collection Time: 04/08/24 10:25 PM   Result Value Ref Range    Mha-Tp Reactive (A) Non Reactive   CBC without differential- Once in AM regardless of delivery time    Collection Time: 04/09/24 12:06 PM   Result Value Ref Range    WBC 13.3 (H) 4.8 - 10.8 K/uL    RBC 4.34 4.20 - 5.40 M/uL    Hemoglobin 10.9 (L) 12.0 - 16.0 g/dL    Hematocrit 34.1 (L) 37.0 - 47.0 %    MCV 78.6 (L) 81.4 - 97.8 fL    MCH 25.1 (L) 27.0 - 33.0 pg    MCHC 32.0 (L) 32.2 - 35.5 g/dL    RDW 46.5 35.9 - 50.0 fL    Platelet Count 252 164 - 446 K/uL    MPV 9.5 9.0 - 12.9 fL       Assessment and Plan  normal postpartum course  No heavy bleeding or foul vaginal discharge     Continue Routine postpartum care  Pt desires to stay as long as possible because she is in a drug rehab center  Ambulate

## 2024-04-12 ENCOUNTER — PHARMACY VISIT (OUTPATIENT)
Dept: PHARMACY | Facility: MEDICAL CENTER | Age: 34
End: 2024-04-12
Payer: COMMERCIAL

## 2024-04-12 ENCOUNTER — ANESTHESIA (OUTPATIENT)
Dept: OBGYN | Facility: MEDICAL CENTER | Age: 34
End: 2024-04-12
Payer: COMMERCIAL

## 2024-04-12 VITALS
TEMPERATURE: 97.7 F | HEIGHT: 66 IN | DIASTOLIC BLOOD PRESSURE: 88 MMHG | HEART RATE: 86 BPM | RESPIRATION RATE: 17 BRPM | SYSTOLIC BLOOD PRESSURE: 139 MMHG | WEIGHT: 264 LBS | BODY MASS INDEX: 42.43 KG/M2 | OXYGEN SATURATION: 97 %

## 2024-04-12 PROCEDURE — RXMED WILLOW AMBULATORY MEDICATION CHARGE: Performed by: OBSTETRICS & GYNECOLOGY

## 2024-04-12 PROCEDURE — A9270 NON-COVERED ITEM OR SERVICE: HCPCS | Performed by: OBSTETRICS & GYNECOLOGY

## 2024-04-12 PROCEDURE — 700102 HCHG RX REV CODE 250 W/ 637 OVERRIDE(OP): Performed by: OBSTETRICS & GYNECOLOGY

## 2024-04-12 RX ADMIN — ACETAMINOPHEN 1000 MG: 500 TABLET, FILM COATED ORAL at 11:07

## 2024-04-12 RX ADMIN — IBUPROFEN 800 MG: 800 TABLET, FILM COATED ORAL at 08:22

## 2024-04-12 RX ADMIN — PRENATAL WITH FERROUS FUM AND FOLIC ACID 1 TABLET: 3080; 920; 120; 400; 22; 1.84; 3; 20; 10; 1; 12; 200; 27; 25; 2 TABLET ORAL at 08:22

## 2024-04-12 RX ADMIN — IBUPROFEN 800 MG: 800 TABLET, FILM COATED ORAL at 00:19

## 2024-04-12 RX ADMIN — ACETAMINOPHEN 1000 MG: 500 TABLET, FILM COATED ORAL at 03:51

## 2024-04-12 ASSESSMENT — PAIN DESCRIPTION - PAIN TYPE
TYPE: ACUTE PAIN;SURGICAL PAIN
TYPE: ACUTE PAIN;SURGICAL PAIN

## 2024-04-12 NOTE — DISCHARGE SUMMARY
Discharge Summary:      Rhonda Lopez    Admit Date:   2024  Discharge Date:  2024     Admitting diagnosis:  Indication for care in labor or delivery [O75.9]  Discharge Diagnosis: Status post  for repeat.  Pregnancy Complications: maternal substance abuse--meth  Tubal Ligation:  no        History:  Past Medical History:   Diagnosis Date    Asthma     inhaler    Psychiatric problem     mood swings,  ADHD     OB History    Para Term  AB Living   5 4 4   1 4   SAB IAB Ectopic Molar Multiple Live Births   1       0 4      # Outcome Date GA Lbr Aubrey/2nd Weight Sex Delivery Anes PTL Lv   5 Term 24 39w0d  3.629 kg (8 lb) M CS-LTranv Spinal N DELANEY   4 Term 21 39w3d  3.54 kg (7 lb 12.9 oz) M CS-LTranv Spinal N DELANEY   3 Term 19 39w4d  3.52 kg (7 lb 12.2 oz) M CS-LTranv Spinal N DELANEY   2 Term 08 40w0d  2.92 kg (6 lb 7 oz) F CS-LTranv Spinal N DELANEY      Birth Comments: C/Section due to unable to progress   1 SAB 10/27/07 4w0d    SAB         Birth Comments: PAssed on its own        Patient has no known allergies.  Patient Active Problem List    Diagnosis Date Noted    Indication for care in labor or delivery 2024    Postoperative visit 06/10/2019    Born by  section 2019    Obesity 2019    Syphilis affecting pregnancy 2019    Carrier of group B Streptococcus 2018    Rubella equivocal  2018    Mild intermittent asthma without complication 2018    History of drug use 2018    History of  delivery 2018    Supervision of high risk pregnancy in third trimester 2018        Hospital Course:   33 y.o. , now para 4, was admitted with the above mentioned diagnosis, underwent Repeat    .. Patient postpartum course was unremarkable, with progressive advancement in diet , ambulation and toleration of oral analgesia. Patient without complaints today and desires discharge tomorrow.      Vitals:     04/10/24 1829 04/10/24 2000 04/11/24 0600 04/11/24 1751   BP: (!) 144/88 114/65 109/72 115/69   Pulse: 88 85 87 89   Resp: 18 18 18 18   Temp: 36.7 °C (98 °F) 36.8 °C (98.2 °F) 36.1 °C (97 °F) 37.1 °C (98.7 °F)   TempSrc: Temporal Temporal Temporal Temporal   SpO2: 97% 97% 95% 98%   Weight:       Height:           Exam:  Breast Exam: negative  Abdomen: Abdomen soft, non-tender. BS normal. No masses,  No organomegaly  Fundus Non Tender: yes  Incision: dry and intact  Perineum: perineum intact  Extremity: extremities, peripheral pulses and reflexes normal     Labs:  Recent Labs     04/08/24 2225 04/09/24  1206   WBC 8.5 13.3*   RBC 4.74 4.34   HEMOGLOBIN 12.2 10.9*   HEMATOCRIT 37.0 34.1*   MCV 78.1* 78.6*   MCH 25.7* 25.1*   MCHC 33.0 32.0*   RDW 46.8 46.5   PLATELETCT 264 252   MPV 9.4 9.5        Activity:   Discharge to home  Pelvic Rest x 6 weeks    Assessment:  normal postpartum course  Discharge Assessment: No areas of skin breakdown/redness; surgical incision intact/healing     Follow up: 2 week for incision check.      Discharge Meds:   Current Outpatient Medications   Medication Sig Dispense Refill    docusate sodium 100 MG Cap Take 100 mg by mouth 2 times a day as needed for Constipation. 60 Capsule 1    ibuprofen (MOTRIN) 800 MG Tab Take 1 Tablet by mouth every 8 hours as needed for Mild Pain or Moderate Pain. 30 Tablet 1    oxyCODONE-acetaminophen (PERCOCET) 5-325 MG Tab Take 1 Tablet by mouth every 6 hours as needed for Moderate Pain or Severe Pain for up to 7 days. 25 Tablet 0       Carrie Fontanez M.D.

## 2024-04-12 NOTE — PROGRESS NOTES
0830  Received report from ANITA Cummings at change of shift. Patient assessed and POC discussed. Patient is resting in bed. Fundus firm, lochia light.  Patient denies any needs at this time. Call light within reach, bed in lowest position. Patient is encouraged to call for pain/med interventions and any other needs.

## 2024-04-12 NOTE — CARE PLAN
The patient is Stable - Low risk of patient condition declining or worsening    Shift Goals  Clinical Goals: fundus frim, lochia WDL  Patient Goals: rooming in, bonding  Family Goals: support, bonding    Progress made toward(s) clinical / shift goals:    Problem: Altered Physiologic Condition  Goal: Patient physiologically stable as evidenced by normal lochia, palpable uterine involution and vitals within normal limits  Outcome: Progressing     Problem: Early Mobilization - Post Surgery  Goal: Early mobilization post surgery  Outcome: Progressing       Patient is not progressing towards the following goals:

## 2024-04-12 NOTE — DISCHARGE INSTRUCTIONS

## 2024-04-12 NOTE — PROGRESS NOTES
0800  Received report from ANITA Cummings at change of shift. Patient assessed and POC discussed. Patient is resting in bed. Fundus firm, lochia light.  Patient denies any needs at this time. Call light within reach, bed in lowest position. Patient is encouraged to call for pain/med interventions and any other needs.    1105   Discharge instructions reviewed. Verbalized understanding. Documents signed    1120  Left facility. Escorted by staff

## 2024-04-12 NOTE — CARE PLAN
The patient is Stable - Low risk of patient condition declining or worsening    Shift Goals  Clinical Goals: VS WDL, Pain management  Patient Goals: rooming in, bonding  Family Goals: support, bonding    Progress made toward(s) clinical / shift goals:    Problem: Psychosocial - Postpartum  Goal: Patient will verbalize and demonstrate effective bonding and parenting behavior  Outcome: Progressing     Problem: Altered Physiologic Condition  Goal: Patient physiologically stable as evidenced by normal lochia, palpable uterine involution and vitals within normal limits  Outcome: Progressing     Problem: Infection - Postpartum  Goal: Postpartum patient will be free of signs and symptoms of infection  Outcome: Progressing     Problem: Respiratory/Oxygenation Function Post-Surgical  Goal: Patient will achieve/maintain normal respiratory rate/effort  Outcome: Progressing     Problem: Bowel Elimination - Post Surgical  Goal: Patient will resume regular bowel sounds and function with no discomfort or distention  Outcome: Progressing       Patient is not progressing towards the following goals:

## 2024-04-22 ENCOUNTER — OFFICE VISIT (OUTPATIENT)
Dept: MEDICAL GROUP | Facility: CLINIC | Age: 34
End: 2024-04-22
Payer: COMMERCIAL

## 2024-04-22 VITALS
DIASTOLIC BLOOD PRESSURE: 70 MMHG | BODY MASS INDEX: 40.66 KG/M2 | SYSTOLIC BLOOD PRESSURE: 118 MMHG | HEIGHT: 66 IN | OXYGEN SATURATION: 95 % | HEART RATE: 80 BPM | WEIGHT: 253 LBS | TEMPERATURE: 95.7 F | RESPIRATION RATE: 16 BRPM

## 2024-04-22 DIAGNOSIS — F19.91 HISTORY OF DRUG USE: ICD-10-CM

## 2024-04-22 DIAGNOSIS — J45.20 MILD INTERMITTENT ASTHMA WITHOUT COMPLICATION: ICD-10-CM

## 2024-04-22 DIAGNOSIS — Z11.3 SCREENING EXAMINATION FOR SEXUALLY TRANSMITTED DISEASE: ICD-10-CM

## 2024-04-22 DIAGNOSIS — O98.119 SYPHILIS AFFECTING PREGNANCY, ANTEPARTUM: ICD-10-CM

## 2024-04-22 DIAGNOSIS — D50.9 IRON DEFICIENCY ANEMIA, UNSPECIFIED IRON DEFICIENCY ANEMIA TYPE: ICD-10-CM

## 2024-04-22 DIAGNOSIS — E66.01 CLASS 3 OBESITY (HCC): ICD-10-CM

## 2024-04-22 DIAGNOSIS — F17.210 CIGARETTE NICOTINE DEPENDENCE WITHOUT COMPLICATION: ICD-10-CM

## 2024-04-22 PROCEDURE — 3078F DIAST BP <80 MM HG: CPT | Performed by: FAMILY MEDICINE

## 2024-04-22 PROCEDURE — 3074F SYST BP LT 130 MM HG: CPT | Performed by: FAMILY MEDICINE

## 2024-04-22 PROCEDURE — 99203 OFFICE O/P NEW LOW 30 MIN: CPT | Performed by: FAMILY MEDICINE

## 2024-04-22 RX ORDER — UREA 10 %
325 LOTION (ML) TOPICAL 2 TIMES DAILY
COMMUNITY
Start: 2024-02-25

## 2024-04-22 RX ORDER — ONDANSETRON 4 MG/1
4 TABLET, ORALLY DISINTEGRATING ORAL EVERY 8 HOURS PRN
COMMUNITY
Start: 2024-03-02

## 2024-04-22 ASSESSMENT — PATIENT HEALTH QUESTIONNAIRE - PHQ9: CLINICAL INTERPRETATION OF PHQ2 SCORE: 0

## 2024-04-22 ASSESSMENT — FIBROSIS 4 INDEX: FIB4 SCORE: 0.8

## 2024-04-22 NOTE — ASSESSMENT & PLAN NOTE
History of syphilis infection and treatment  Recent treponemal tests positive with negative RPR c/w prior infection  No new partner or active symptoms

## 2024-04-22 NOTE — ASSESSMENT & PLAN NOTE
Counseled on health benefits of smoking cessation  Patient is precontemplative at this time  Will follow-up for additional support if she desires quitting or further reducing her nicotine use in the future

## 2024-04-22 NOTE — ASSESSMENT & PLAN NOTE
Gained weight with pregnancy and psychiatric medications  Offered labs for checking on metabolic health as well as follow-up of mild anemia from pregnancy

## 2024-04-22 NOTE — PROGRESS NOTES
Subjective:     CC: establish care    HPI:   Rhonda presents today to establish care,    She is here with her 2 week old infant son, who is also here for a well child check.    No concerns, feeling healthy at this time  Seeing OB for postpartum, getting staples out  Hasn't seen a doctor other than for OB care for estimated 10 years    PMH:   Syphilis in 2018, treated  Asthma since childhood - uses albuterol inhaler as needed.  Mainly when she is sick. No recent hospitalizations or systemic steroids.    ADHD and bipolar disorder - goes to [facility], feels stable  Anemia in pregnancy - taking iron    Meds:  Abilify and zoloft every morning (stable regimen for years)  Albuterol PRN  Iron supplement    Substance use history:  Last meth since September.  No cravings or triggers currently.  Has quit with every pregnancy, return to use in the past was because she either ran out of her psych medications and also because she enjoys the feeling from meth.  Wants to quit for good this time, her children are her motivating factor  Has been to halfway and prison in the past, related to her drug use    Denies any other substances other than nicotrine  Smoking cigarettes: currently smokes 4/day  Vaping nicotine: a few puffs per day    ETOH: occasional.  Last drink was a year ago.    Contraception: none, does not desire contraception.  Reports her OB told her not to have more children since she has had 4 c-sections.  But she and her partner want more kids.    Surgeries: 4 c-sections    FHx:   Mom - OA, depression  Multiple maternal uncles with diabetes and HTN  No cancers or heart disease  Addiction on both sides,father alcohol use disorder  Sister suicide attempt  Mental health disorders in the family    SHx:  4 children  [facility]  See above    Pap normal in 2021  Doesn't think she had one in halfway during this recent pregnancy, but she is not sure  Reports she needs eyeglasses, has optometry appt coming up    Problem    Class 3 Obesity (Hcc)   Cigarette Nicotine Dependence Without Complication   Syphilis Affecting Pregnancy    With treatment     Mild Intermittent Asthma Without Complication   History of Drug Use    Last used meth 10/2018. Possible exposure/use 2019. Please test mom and baby in L&D         Current Outpatient Medications Ordered in Epic   Medication Sig Dispense Refill    sertraline (ZOLOFT) 50 MG Tab       ferrous sulfate 325 (65 Fe) MG EC tablet Take 325 mg by mouth 2 times a day.      ibuprofen (MOTRIN) 800 MG Tab Take 1 Tablet by mouth every 8 hours as needed for Mild Pain or Moderate Pain. 30 Tablet 1    ARIPiprazole (ABILIFY) 10 MG Tab Take 10 mg by mouth every day.      albuterol 108 (90 Base) MCG/ACT Aero Soln inhalation aerosol Inhale 2 Puffs every 6 hours as needed for Shortness of Breath.      ondansetron (ZOFRAN ODT) 4 MG TABLET DISPERSIBLE Take 4 mg by mouth every 8 hours as needed for Nausea/Vomiting. DISSOLVE 1 TABLET IN MOUTH EVERY 8 HOURS AS NEEDED FOR NAUSEA AND VOMITING (Patient not taking: Reported on 2024)      docusate sodium 100 MG Cap Take 100 mg by mouth 2 times a day as needed for Constipation. (Patient not taking: Reported on 2024) 60 Capsule 1    Prenatal Vit-Fe Fumarate-FA (PRENATAL VITAMINS) 28-0.8 MG Tab Take 1 Tab by mouth every day. (Patient not taking: Reported on 2024) 90 Tab 3     No current Epic-ordered facility-administered medications on file.       Past Medical History:   Diagnosis Date    Asthma     inhaler    Psychiatric problem     mood swings,  ADHD        Past Surgical History:   Procedure Laterality Date    REPEAT C SECTION N/A 2024    Procedure: REPEAT  SECTION;  Surgeon: Carrie Fontanez M.D.;  Location: [facility];  Service: Labor and Delivery    REPEAT C SECTION Bilateral 2021    Procedure:  SECTION, REPEAT;  Surgeon: Bonnie Ghotra M.D.;  Location: [facility];  Service: Labor and Delivery  "   REPEAT C SECTION  2019    Procedure:  SECTION, REPEAT;  Surgeon: Adam Ortiz M.D.;  Location: LABOR AND DELIVERY;  Service: Labor and Delivery    PRIMARY C SECTION  2008    done at [facility]    OTHER      [id number]        History reviewed. No pertinent family history.       ROS:  Gen: no fevers/chills, +weight gain  Pulm: no sob, no cough, no wheezing  CV: no chest pain, no palpitations  GI: no nausea/vomiting, no diarrhea  : no dysuria or vaginal bleeding  Psych: mood is stable    Objective:     Exam:  /70 (BP Location: Left arm, Patient Position: Sitting, BP Cuff Size: Large adult)   Pulse 80   Temp (!) 35.4 °C (95.7 °F) (Temporal)   Resp 16   Ht 1.676 m (5' 6\")   Wt 115 kg (253 lb)   SpO2 95%   BMI 40.84 kg/m²  Body mass index is 40.84 kg/m².    Gen: Alert and oriented, No apparent distress.  Head:  NCAT, EOMI, sclera clear without discharge  Neck: Neck is supple without lymphadenopathy.  Lungs: Normal effort, CTA bilaterally, no wheezes, rhonchi, or rales  CV: Regular rate and rhythm. No murmurs, rubs, or gallops.  Abd:   Non-distended, soft  Ext: No clubbing, cyanosis, edema.  MSK: Unassisted gait  Derm: No lesions on exposed skin, +tattoos  Psych: normal mood and affect        Assessment & Plan:     33 y.o. female with the following -     Problem List Items Addressed This Visit       Mild intermittent asthma without complication     No active symptoms  Has PRN albuterol         History of drug use     Not using actively  [facility] and [facility]  Offered support  Goal is to maintain sobriety from methamphetamines         Syphilis affecting pregnancy     History of syphilis infection and treatment  Recent treponemal tests positive with negative RPR c/w prior infection  No new partner or active symptoms           Class 3 obesity (HCC)     Gained weight with pregnancy and psychiatric medications  Offered labs for " checking on metabolic health as well as follow-up of mild anemia from pregnancy           Relevant Orders    Comp Metabolic Panel    HEMOGLOBIN A1C    TSH WITH REFLEX TO FT4    Lipid Profile    Cigarette nicotine dependence without complication     Counseled on health benefits of smoking cessation  Patient is precontemplative at this time  Will follow-up for additional support if she desires quitting or further reducing her nicotine use in the future          Other Visit Diagnoses       Screening examination for sexually transmitted disease        Relevant Orders    HEP C VIRUS ANTIBODY    HEP B SURFACE ANTIGEN    Chlamydia/GC, PCR (Urine)    HIV AG/AB COMBO ASSAY SCREENING    Iron deficiency anemia, unspecified iron deficiency anemia type        Relevant Orders    CBC WITHOUT DIFFERENTIAL          Follow-up: PRN for wellness visit, pap smear and follow-up labs

## 2024-04-22 NOTE — ASSESSMENT & PLAN NOTE
Not using actively  [facility], [facility] and has therapy [facility]  Offered support  Goal is to maintain sobriety from methamphetamines

## 2024-04-23 ENCOUNTER — HOSPITAL ENCOUNTER (EMERGENCY)
Facility: MEDICAL CENTER | Age: 34
End: 2024-04-23
Attending: OBSTETRICS & GYNECOLOGY | Admitting: OBSTETRICS & GYNECOLOGY
Payer: COMMERCIAL

## 2024-04-23 VITALS
HEART RATE: 86 BPM | DIASTOLIC BLOOD PRESSURE: 79 MMHG | WEIGHT: 250 LBS | OXYGEN SATURATION: 95 % | BODY MASS INDEX: 40.18 KG/M2 | SYSTOLIC BLOOD PRESSURE: 122 MMHG | HEIGHT: 66 IN | TEMPERATURE: 98.8 F | RESPIRATION RATE: 18 BRPM

## 2024-04-23 PROCEDURE — 302449 STATCHG TRIAGE ONLY (STATISTIC)

## 2024-04-23 ASSESSMENT — PAIN DESCRIPTION - PAIN TYPE: TYPE: ACUTE PAIN

## 2024-04-23 ASSESSMENT — FIBROSIS 4 INDEX: FIB4 SCORE: 0.8

## 2024-04-24 NOTE — PROGRESS NOTES
"1910: patient presents to triage c/o LLQ pain. Pt states she heard her baby cry out suddenly and it startled her, she than jumped out of bed and felt a \"pop\" and it's been sore ever since.  Pt delivered via c/s on April 9th.  Pt taken to triage room 6, VS taken, pt is afebrile.    1920: Incision inspected; Tegaderm in place and intact. Incision well approximated, no bleeding, no leaking, no redness.    1937: Dr Fontanez called and given report on pt's complaints. Pt has appointment tomorrow and MD will evaluate. May go home with appropriate instructions.  "

## 2024-04-24 NOTE — DISCHARGE INSTRUCTIONS
Please keep your appointment with Dr Fontanez tomorrow April 24th.  Place a hot/cold pack to area for comfort.  No lifting until resolved.

## (undated) DEVICE — SLEEVE, SEQUENTIAL CALF REG

## (undated) DEVICE — SUTURE 0 VICRYL PLUS CT-1 - 36 INCH (36/BX)

## (undated) DEVICE — LACTATED RINGERS INJ 1000 ML - (14EA/CA 60CA/PF)

## (undated) DEVICE — SET EXTENSION WITH 2 PORTS (48EA/CA) ***PART #2C8610 IS A SUBSTITUTE*****

## (undated) DEVICE — PAD GROUNDING PRE-JELLED - (50EA/PK)

## (undated) DEVICE — PACK C-SECTION (2EA/CA)

## (undated) DEVICE — SUTURE 4-0 27IN VCRL PLUS ANTI (36PK/BX)

## (undated) DEVICE — SUTURE 2-0 CHROMIC GUT CT-1 27 (36PK/BX)"

## (undated) DEVICE — HEAD HOLDER JUNIOR/ADULT

## (undated) DEVICE — RETRACTOR O C SECTION LRY - (5/BX)

## (undated) DEVICE — WATER IRRIGATION STERILE 1000ML (12EA/CA)

## (undated) DEVICE — TRAY SPINAL ANESTHESIA NON-SAFETY (10/CA)

## (undated) DEVICE — SUTURE 1 VICRYL PLUSCT-1 27IN - (36/BX)

## (undated) DEVICE — TAPE CLOTH MEDIPORE 6 INCH - (12RL/CA)

## (undated) DEVICE — PENCIL ELECTSURG 10FT HLSTR - WITH BLADE (50EA/CA)

## (undated) DEVICE — TUBING CLEARLINK DUO-VENT - C-FLO (48EA/CA)

## (undated) DEVICE — GLOVE BIOGEL SZ 7 SURGICAL PF LTX - (50PR/BX 4BX/CA)

## (undated) DEVICE — CANISTER SUCTION 3000ML MECHANICAL FILTER AUTO SHUTOFF MEDI-VAC NONSTERILE LF DISP  (40EA/CA)

## (undated) DEVICE — KIT  I.V. START (100EA/CA)

## (undated) DEVICE — CATHETER IV NON-SAFETY 18 GA X 1 1/4 (50/BX 4BX/CA)

## (undated) DEVICE — GLOVE BIOGEL SZ 6 PF LATEX - (50EA/BX 4BX/CA)

## (undated) DEVICE — PAD LAP STERILE 18 X 18 - (5/PK 40PK/CA)

## (undated) DEVICE — SUTURE 1 CHROMIC CTX ETHICON - (36PK/BX)

## (undated) DEVICE — BLANKET UNDERBODY FULL ACCES - (5/CA)

## (undated) DEVICE — DETERGENT RENUZYME PLUS 10 OZ PACKET (50/BX)

## (undated) DEVICE — SUTURE 4-0 MONOCRYL PLUS PS-1 - 27 INCH (36/BX)

## (undated) DEVICE — DRESSING INTERCEED ABSORBABLE ADHESION BARRIER TC7 (10EA/CA)

## (undated) DEVICE — SPONGE GAUZESTER 4 X 4 4PLY - (128PK/CA)

## (undated) DEVICE — SPONGE RADIOPAQUE CTN X-LG - STERILE (50PK/CA) MADE TO ORDER ITEM AND HAS A 4-6 WEEK LEAD TIME

## (undated) DEVICE — CHLORAPREP 26 ML APPLICATOR - ORANGE TINT(25/CA)

## (undated) DEVICE — GLOVE BIOGEL SZ 8 SURGICAL PF LTX - (50PR/BX 4BX/CA)

## (undated) DEVICE — SUTURE 0 VICRYL PLUS CT 36 (36PK/BX)"

## (undated) DEVICE — GLOVE BIOGEL INDICATOR SZ 7SURGICAL PF LTX - (50/BX 4BX/CA)

## (undated) DEVICE — STAPLER SKIN DISP - (6/BX 10BX/CA) VISISTAT

## (undated) DEVICE — SODIUM CHL IRRIGATION 0.9% 1000ML (12EA/CA)

## (undated) DEVICE — CLOSURE PRINEO SKIN - (2EA/BX)

## (undated) DEVICE — SUTURE 3-0 CHROMIC CT-1 36 (36PK/BX)"

## (undated) DEVICE — DRESSING POST OP BORDER 4 X 10 (5EA/BX)

## (undated) DEVICE — SUTURE 3-0 VICRYL PLUS CT-1 - 36 INCH (36/BX)

## (undated) DEVICE — SHEATH RO 4F 10CM (10EA/BX)

## (undated) DEVICE — PACK ROOM TURNOVER L&D (12/CA)

## (undated) DEVICE — ELECTRODE DUAL RETURN W/ CORD - (50/PK)

## (undated) DEVICE — WATER IRRIG. STER. 1500 ML - (9/CA)

## (undated) DEVICE — TRAY BLADDER CARE W/ 16 FR FOLEY CATHETER STATLOCK  (10/CA)

## (undated) DEVICE — TELFA 8 X 10 BIOSEAL - (50EA/CA)